# Patient Record
Sex: MALE | Race: WHITE | NOT HISPANIC OR LATINO | Employment: FULL TIME | URBAN - METROPOLITAN AREA
[De-identification: names, ages, dates, MRNs, and addresses within clinical notes are randomized per-mention and may not be internally consistent; named-entity substitution may affect disease eponyms.]

---

## 2023-03-07 PROBLEM — R21 RASH: Status: ACTIVE | Noted: 2023-03-07

## 2023-03-07 PROBLEM — S90.859A FOREIGN BODY IN FOOT: Status: ACTIVE | Noted: 2023-03-07

## 2023-03-07 PROBLEM — S49.91XA SHOULDER INJURY, RIGHT, INITIAL ENCOUNTER: Status: ACTIVE | Noted: 2023-03-07

## 2023-03-07 PROBLEM — E66.3 OVERWEIGHT WITH BODY MASS INDEX (BMI) OF 26 TO 26.9 IN ADULT: Status: ACTIVE | Noted: 2023-03-07

## 2023-03-07 PROBLEM — S09.90XA HEAD INJURY: Status: ACTIVE | Noted: 2023-03-07

## 2023-03-07 PROBLEM — H92.09 EAR PAIN: Status: ACTIVE | Noted: 2023-03-07

## 2023-03-07 PROBLEM — R10.9 PAIN IN THE ABDOMEN: Status: ACTIVE | Noted: 2023-03-07

## 2023-03-07 PROBLEM — R93.5 ABNORMAL CT OF THE ABDOMEN: Status: ACTIVE | Noted: 2023-03-07

## 2023-03-07 PROBLEM — K58.0 IRRITABLE BOWEL SYNDROME WITH DIARRHEA: Status: ACTIVE | Noted: 2023-03-07

## 2023-03-07 PROBLEM — R11.10 VOMITING: Status: ACTIVE | Noted: 2023-03-07

## 2023-03-07 PROBLEM — S99.929A FOOT INJURY: Status: ACTIVE | Noted: 2023-03-07

## 2023-03-07 PROBLEM — K52.9 COLITIS: Status: ACTIVE | Noted: 2023-03-07

## 2023-03-07 PROBLEM — Z86.69 HISTORY OF RECURRENT EAR INFECTION: Status: ACTIVE | Noted: 2023-03-07

## 2023-03-07 PROBLEM — R53.83 FATIGUE: Status: ACTIVE | Noted: 2023-03-07

## 2023-03-07 PROBLEM — R10.9 RIGHT SIDED ABDOMINAL PAIN: Status: ACTIVE | Noted: 2023-03-07

## 2023-03-07 PROBLEM — K52.9 CHRONIC DIARRHEA: Status: ACTIVE | Noted: 2023-03-07

## 2023-03-07 RX ORDER — DICYCLOMINE HYDROCHLORIDE 10 MG/1
10 CAPSULE ORAL 3 TIMES DAILY
COMMUNITY

## 2023-03-07 RX ORDER — OMEPRAZOLE 40 MG/1
CAPSULE, DELAYED RELEASE ORAL
COMMUNITY

## 2023-03-08 ENCOUNTER — OFFICE VISIT (OUTPATIENT)
Dept: PRIMARY CARE | Facility: CLINIC | Age: 33
End: 2023-03-08
Payer: COMMERCIAL

## 2023-03-08 VITALS
WEIGHT: 167 LBS | DIASTOLIC BLOOD PRESSURE: 82 MMHG | BODY MASS INDEX: 23.91 KG/M2 | SYSTOLIC BLOOD PRESSURE: 132 MMHG | HEIGHT: 70 IN

## 2023-03-08 DIAGNOSIS — Z00.00 ROUTINE CHECK-UP: Primary | ICD-10-CM

## 2023-03-08 PROCEDURE — 99213 OFFICE O/P EST LOW 20 MIN: CPT | Performed by: INTERNAL MEDICINE

## 2023-03-11 NOTE — PROGRESS NOTES
"OFFICE NOTE    NAME OF THE PATIENT: Prem Mirza     YOB: 1990    CHIEF COMPLAINT:  This gentleman today came here for follow-up on various conditions.  His Crohn's disease flareup is better.  For pain he is on dicyclomine.  He is going to see Dr. Huizar.  He had a biopsy taken.  He is a bit disappointed by that.  He loves his job, but pain becomes so intractable he has to take time off.  He is very frustrated with that.    PAST MEDICAL HISTORY:  Reviewed on EMR, unchanged.    CURRENT MEDICATIONS:  Reviewed on EMR, unchanged.  List reviewed.    ALLERGIES:  Reviewed on EMR, unchanged.    SOCIAL HISTORY:  Reviewed on EMR, unchanged.  He does not smoke, does not drink alcohol.    FAMILY HISTORY:  Reviewed on EMR, unchanged.    REVIEW OF SYSTEMS:  All 12 systems reviewed and pertaining covered in history and physical.    PHYSICAL EXAMINATION  VITAL SIGNS:  As recorded and reviewed from EMR.  RESPIRATORY:  The patient had normal inspirations and expirations.  The breath sounds were equal bilaterally and clear to auscultation.  CARDIOVASCULAR:  The patient had S1 normal, split S2 without obvious rubs, clicks, or murmurs.    GASTROINTESTINAL:  There was no hepatosplenomegaly.  There were no palpable masses and no inguinal nodes.  EXTREMITIES:  Legs had no edema.  NEUROLOGIC:  The patient had normal cranial nerves.  The reflexes, sensory, and motor examination were grossly within normal limits.    LAB WORK:  Laboratory testing discussed.    ASSESSMENT AND PLAN:  Crohn's disease, slow improvement.  Anemia.  Monitor.  Follow-up appointment with me in three to six months, but I will rely on GI to do workup.  I will take care of it.  [facility] paper filled out.    Kindly review this note in conjunction with EMR.   Subjective   Patient ID: Prem Mirza is a 32 y.o. male who presents for Follow-up.      HPI    Review of Systems    Objective   /82   Ht 1.765 m (5' 9.5\")   Wt 75.8 kg (167 lb)   BMI 24.31 " kg/m²       Physical Exam    Assessment/Plan   Problem List Items Addressed This Visit    None

## 2023-06-19 ENCOUNTER — OFFICE VISIT (OUTPATIENT)
Dept: PRIMARY CARE | Facility: CLINIC | Age: 33
End: 2023-06-19
Payer: COMMERCIAL

## 2023-06-19 VITALS
BODY MASS INDEX: 24.14 KG/M2 | HEIGHT: 70 IN | SYSTOLIC BLOOD PRESSURE: 124 MMHG | DIASTOLIC BLOOD PRESSURE: 72 MMHG | WEIGHT: 168.6 LBS

## 2023-06-19 DIAGNOSIS — T14.90XA TRAUMA: ICD-10-CM

## 2023-06-19 DIAGNOSIS — G89.29 CHRONIC LEFT SHOULDER PAIN: ICD-10-CM

## 2023-06-19 DIAGNOSIS — M25.512 CHRONIC LEFT SHOULDER PAIN: ICD-10-CM

## 2023-06-19 DIAGNOSIS — B99.9 INFECTION: ICD-10-CM

## 2023-06-19 PROCEDURE — 90471 IMMUNIZATION ADMIN: CPT | Performed by: INTERNAL MEDICINE

## 2023-06-19 PROCEDURE — 90715 TDAP VACCINE 7 YRS/> IM: CPT | Performed by: INTERNAL MEDICINE

## 2023-06-19 PROCEDURE — 99214 OFFICE O/P EST MOD 30 MIN: CPT | Performed by: INTERNAL MEDICINE

## 2023-06-19 RX ORDER — NABUMETONE 750 MG/1
750 TABLET, FILM COATED ORAL 2 TIMES DAILY
Qty: 60 TABLET | Refills: 11 | Status: SHIPPED | OUTPATIENT
Start: 2023-06-19 | End: 2023-12-17 | Stop reason: HOSPADM
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
## 2023-12-12 ENCOUNTER — HOSPITAL ENCOUNTER (EMERGENCY)
Facility: HOSPITAL | Age: 33
Discharge: HOME | End: 2023-12-12
Attending: EMERGENCY MEDICINE
Payer: COMMERCIAL

## 2023-12-12 ENCOUNTER — APPOINTMENT (OUTPATIENT)
Dept: RADIOLOGY | Facility: HOSPITAL | Age: 33
End: 2023-12-12
Payer: COMMERCIAL

## 2023-12-12 VITALS
OXYGEN SATURATION: 97 % | WEIGHT: 186.95 LBS | RESPIRATION RATE: 18 BRPM | HEART RATE: 80 BPM | HEIGHT: 69 IN | BODY MASS INDEX: 27.69 KG/M2 | DIASTOLIC BLOOD PRESSURE: 77 MMHG | SYSTOLIC BLOOD PRESSURE: 158 MMHG | TEMPERATURE: 97.9 F

## 2023-12-12 DIAGNOSIS — R10.84 ABDOMINAL PAIN, GENERALIZED: Primary | ICD-10-CM

## 2023-12-12 LAB
ALBUMIN SERPL-MCNC: 4.1 G/DL (ref 3.5–5)
ALP BLD-CCNC: 79 U/L (ref 35–125)
ALT SERPL-CCNC: 18 U/L (ref 5–40)
ANION GAP SERPL CALC-SCNC: 14 MMOL/L
APPEARANCE UR: CLEAR
AST SERPL-CCNC: 30 U/L (ref 5–40)
BASOPHILS # BLD AUTO: 0.05 X10*3/UL (ref 0–0.1)
BASOPHILS NFR BLD AUTO: 0.3 %
BILIRUB DIRECT SERPL-MCNC: 0.2 MG/DL (ref 0–0.2)
BILIRUB SERPL-MCNC: 1.1 MG/DL (ref 0.1–1.2)
BILIRUB UR STRIP.AUTO-MCNC: NEGATIVE MG/DL
BUN SERPL-MCNC: 8 MG/DL (ref 8–25)
CALCIUM SERPL-MCNC: 9.2 MG/DL (ref 8.5–10.4)
CHLORIDE SERPL-SCNC: 101 MMOL/L (ref 97–107)
CO2 SERPL-SCNC: 22 MMOL/L (ref 24–31)
COLOR UR: YELLOW
CREAT SERPL-MCNC: 0.9 MG/DL (ref 0.4–1.6)
EOSINOPHIL # BLD AUTO: 0.34 X10*3/UL (ref 0–0.7)
EOSINOPHIL NFR BLD AUTO: 2.4 %
ERYTHROCYTE [DISTWIDTH] IN BLOOD BY AUTOMATED COUNT: 13 % (ref 11.5–14.5)
GFR SERPL CREATININE-BSD FRML MDRD: >90 ML/MIN/1.73M*2
GLUCOSE SERPL-MCNC: 96 MG/DL (ref 65–99)
GLUCOSE UR STRIP.AUTO-MCNC: NORMAL MG/DL
HCT VFR BLD AUTO: 45.8 % (ref 41–52)
HGB BLD-MCNC: 15.9 G/DL (ref 13.5–17.5)
IMM GRANULOCYTES # BLD AUTO: 0.04 X10*3/UL (ref 0–0.7)
IMM GRANULOCYTES NFR BLD AUTO: 0.3 % (ref 0–0.9)
KETONES UR STRIP.AUTO-MCNC: ABNORMAL MG/DL
LEUKOCYTE ESTERASE UR QL STRIP.AUTO: NEGATIVE
LIPASE SERPL-CCNC: 26 U/L (ref 16–63)
LYMPHOCYTES # BLD AUTO: 1.35 X10*3/UL (ref 1.2–4.8)
LYMPHOCYTES NFR BLD AUTO: 9.4 %
MCH RBC QN AUTO: 29.9 PG (ref 26–34)
MCHC RBC AUTO-ENTMCNC: 34.7 G/DL (ref 32–36)
MCV RBC AUTO: 86 FL (ref 80–100)
MONOCYTES # BLD AUTO: 1 X10*3/UL (ref 0.1–1)
MONOCYTES NFR BLD AUTO: 7 %
MUCOUS THREADS #/AREA URNS AUTO: NORMAL /LPF
NEUTROPHILS # BLD AUTO: 11.54 X10*3/UL (ref 1.2–7.7)
NEUTROPHILS NFR BLD AUTO: 80.6 %
NITRITE UR QL STRIP.AUTO: NEGATIVE
NRBC BLD-RTO: 0 /100 WBCS (ref 0–0)
PH UR STRIP.AUTO: 6.5 [PH]
PLATELET # BLD AUTO: 195 X10*3/UL (ref 150–450)
POTASSIUM SERPL-SCNC: 3.7 MMOL/L (ref 3.4–5.1)
PROT SERPL-MCNC: 7.3 G/DL (ref 5.9–7.9)
PROT UR STRIP.AUTO-MCNC: ABNORMAL MG/DL
RBC # BLD AUTO: 5.31 X10*6/UL (ref 4.5–5.9)
RBC # UR STRIP.AUTO: NEGATIVE /UL
RBC #/AREA URNS AUTO: NORMAL /HPF
SODIUM SERPL-SCNC: 137 MMOL/L (ref 133–145)
SP GR UR STRIP.AUTO: 1.03
UROBILINOGEN UR STRIP.AUTO-MCNC: NORMAL MG/DL
WBC # BLD AUTO: 14.3 X10*3/UL (ref 4.4–11.3)
WBC #/AREA URNS AUTO: NORMAL /HPF

## 2023-12-12 PROCEDURE — 99284 EMERGENCY DEPT VISIT MOD MDM: CPT | Mod: 25 | Performed by: EMERGENCY MEDICINE

## 2023-12-12 PROCEDURE — 85025 COMPLETE CBC W/AUTO DIFF WBC: CPT | Performed by: EMERGENCY MEDICINE

## 2023-12-12 PROCEDURE — 82374 ASSAY BLOOD CARBON DIOXIDE: CPT | Performed by: EMERGENCY MEDICINE

## 2023-12-12 PROCEDURE — 96374 THER/PROPH/DIAG INJ IV PUSH: CPT | Mod: 59

## 2023-12-12 PROCEDURE — 2550000001 HC RX 255 CONTRASTS: Performed by: EMERGENCY MEDICINE

## 2023-12-12 PROCEDURE — 83690 ASSAY OF LIPASE: CPT | Performed by: EMERGENCY MEDICINE

## 2023-12-12 PROCEDURE — 36415 COLL VENOUS BLD VENIPUNCTURE: CPT | Performed by: EMERGENCY MEDICINE

## 2023-12-12 PROCEDURE — 74177 CT ABD & PELVIS W/CONTRAST: CPT

## 2023-12-12 PROCEDURE — 84075 ASSAY ALKALINE PHOSPHATASE: CPT | Performed by: EMERGENCY MEDICINE

## 2023-12-12 PROCEDURE — 81001 URINALYSIS AUTO W/SCOPE: CPT | Performed by: EMERGENCY MEDICINE

## 2023-12-12 PROCEDURE — 96375 TX/PRO/DX INJ NEW DRUG ADDON: CPT | Mod: 59

## 2023-12-12 PROCEDURE — 2500000004 HC RX 250 GENERAL PHARMACY W/ HCPCS (ALT 636 FOR OP/ED): Performed by: EMERGENCY MEDICINE

## 2023-12-12 PROCEDURE — 2500000004 HC RX 250 GENERAL PHARMACY W/ HCPCS (ALT 636 FOR OP/ED)

## 2023-12-12 PROCEDURE — 71046 X-RAY EXAM CHEST 2 VIEWS: CPT

## 2023-12-12 RX ORDER — ONDANSETRON HYDROCHLORIDE 2 MG/ML
4 INJECTION, SOLUTION INTRAVENOUS ONCE
Status: COMPLETED | OUTPATIENT
Start: 2023-12-12 | End: 2023-12-12

## 2023-12-12 RX ORDER — CIPROFLOXACIN 500 MG/1
500 TABLET ORAL 2 TIMES DAILY
Qty: 14 TABLET | Refills: 0 | Status: SHIPPED | OUTPATIENT
Start: 2023-12-12 | End: 2023-12-19

## 2023-12-12 RX ORDER — ONDANSETRON HYDROCHLORIDE 2 MG/ML
INJECTION, SOLUTION INTRAVENOUS
Status: COMPLETED
Start: 2023-12-12 | End: 2023-12-12

## 2023-12-12 RX ORDER — METRONIDAZOLE 500 MG/1
500 TABLET ORAL 3 TIMES DAILY
Qty: 21 TABLET | Refills: 0 | Status: SHIPPED | OUTPATIENT
Start: 2023-12-12 | End: 2023-12-19

## 2023-12-12 RX ORDER — MORPHINE SULFATE 4 MG/ML
4 INJECTION, SOLUTION INTRAMUSCULAR; INTRAVENOUS ONCE
Status: COMPLETED | OUTPATIENT
Start: 2023-12-12 | End: 2023-12-12

## 2023-12-12 RX ADMIN — ONDANSETRON HYDROCHLORIDE 4 MG: 2 INJECTION INTRAMUSCULAR; INTRAVENOUS at 13:35

## 2023-12-12 RX ADMIN — IOHEXOL 75 ML: 350 INJECTION, SOLUTION INTRAVENOUS at 13:58

## 2023-12-12 RX ADMIN — ONDANSETRON 4 MG: 2 INJECTION INTRAMUSCULAR; INTRAVENOUS at 13:35

## 2023-12-12 RX ADMIN — MORPHINE SULFATE 4 MG: 4 INJECTION, SOLUTION INTRAMUSCULAR; INTRAVENOUS at 13:23

## 2023-12-12 RX ADMIN — ONDANSETRON 4 MG: 2 INJECTION INTRAMUSCULAR; INTRAVENOUS at 13:23

## 2023-12-12 ASSESSMENT — COLUMBIA-SUICIDE SEVERITY RATING SCALE - C-SSRS
1. IN THE PAST MONTH, HAVE YOU WISHED YOU WERE DEAD OR WISHED YOU COULD GO TO SLEEP AND NOT WAKE UP?: NO
6. HAVE YOU EVER DONE ANYTHING, STARTED TO DO ANYTHING, OR PREPARED TO DO ANYTHING TO END YOUR LIFE?: NO
2. HAVE YOU ACTUALLY HAD ANY THOUGHTS OF KILLING YOURSELF?: NO

## 2023-12-12 ASSESSMENT — PAIN DESCRIPTION - ORIENTATION
ORIENTATION: LEFT
ORIENTATION: LEFT;UPPER

## 2023-12-12 ASSESSMENT — PAIN DESCRIPTION - FREQUENCY: FREQUENCY: CONSTANT/CONTINUOUS

## 2023-12-12 ASSESSMENT — PAIN - FUNCTIONAL ASSESSMENT
PAIN_FUNCTIONAL_ASSESSMENT: 0-10
PAIN_FUNCTIONAL_ASSESSMENT: 0-10

## 2023-12-12 ASSESSMENT — PAIN DESCRIPTION - LOCATION
LOCATION: ABDOMEN
LOCATION: ABDOMEN

## 2023-12-12 ASSESSMENT — PAIN SCALES - GENERAL
PAINLEVEL_OUTOF10: 10 - WORST POSSIBLE PAIN
PAINLEVEL_OUTOF10: 7

## 2023-12-12 ASSESSMENT — PAIN DESCRIPTION - DESCRIPTORS: DESCRIPTORS: SHARP

## 2023-12-12 ASSESSMENT — PAIN DESCRIPTION - ONSET: ONSET: GRADUAL

## 2023-12-12 ASSESSMENT — PAIN DESCRIPTION - PAIN TYPE: TYPE: ACUTE PAIN

## 2023-12-12 ASSESSMENT — PAIN DESCRIPTION - PROGRESSION: CLINICAL_PROGRESSION: GRADUALLY WORSENING

## 2023-12-12 NOTE — Clinical Note
Prem Mirza was seen and treated in our emergency department on 12/12/2023.  He may return to work on 12/13/2023.       If you have any questions or concerns, please don't hesitate to call.      Onur Mitchell MD

## 2023-12-12 NOTE — ED TRIAGE NOTES
TRIAGE NOTE   I saw the patient as the Clinician in Triage and performed a brief history and physical exam, established acuity, and ordered appropriate tests to develop basic plan of care. Patient will be seen by an [facility], resident and/or physician who will independently evaluate the patient. Please see subsequent provider notes for further details and disposition.     Brief HPI: In brief, Prem Mirza is a 33 y.o. male that presents for   Left-sided rib, abdominal pain.  He has a history of includes Crohn's disease.  He has had left upper quadrant and left lower rib pain for the last 1 day or so.  Seems to have been aggravated by eating although uncertain if this is related to his Crohn's.  No fevers.     Focused Physical exam:   General: Awake, alert, oriented and appears  uncomfortable, not overtly ill sitting upright  HEENT: NCAT, EOMI, sclerae anicteric, oropharynx clear, and neck is supple  CV: RRR, no extremity pulse deficits  Resp: Lungs CTA b/l, no respiratory distress or accessory muscle use  Abd: Soft,  left upper quadrant tenderness to deep palpation, no rebound, or guarding  Ext: WWP, cap refill < 2 sec  Skin: Dry without rash  Neuro: No focal deficits, gait steady     Plan/MDM:   33-year-old male with history that includes Crohn's disease presents reporting left-sided pain that appears to be low rib and  upper abdominal pain.  He clearly has abdominal tenderness on exam.  Colitis, diverticulitis, Crohn's disease otherwise all considered.   No armando respiratory symptoms and I doubt this represents pneumonia or pneumothorax.  Plan for lab work, imaging, symptom control, reassessment.    Please see subsequent provider note for further details and disposition

## 2023-12-12 NOTE — ED PROVIDER NOTES
HPI   Chief Complaint   Patient presents with    Abdominal Pain     Since 1900 last night, LUQ abd pain- no trauma, nausea and vomit last night, hx of crohns,        HPI  Patient 33-year-old male here for evaluation of abdominal pain, generally left-sided, history of Crohn's, says that he does not take any medications for this as he chooses not to, he also does not follow with a gastroenterologist as he chooses not to.  He indicated that he believes he might be having a flareup as he had on before, and past she has been treated with Cipro says that he has trouble taking nearly all other antibiotics due to hives.  He indicated no injury or trauma.                  No data recorded                Patient History   No past medical history on file.  No past surgical history on file.  No family history on file.  Social History     Tobacco Use    Smoking status: Never    Smokeless tobacco: Never   Substance Use Topics    Alcohol use: Not on file    Drug use: Never       Physical Exam   ED Triage Vitals [12/12/23 1300]   Temp Heart Rate Resp BP   36.6 °C (97.9 °F) 80 20 153/84      SpO2 Temp Source Heart Rate Source Patient Position   95 % Oral Monitor Sitting      BP Location FiO2 (%)     Right arm --       Physical Exam  PHYSICAL EXAMINATION    GENERAL APPEARANCE: Awake and alert.     VITAL SIGNS: As per the nurses' triage record.     HEENT: Normocephalic, atraumatic. Extraocular muscles are intact. Pupils equal round and reactive to light. Conjunctiva are pink. Negative scleral icterus. Mucous membranes are moist. Tongue in the midline. Pharynx was without erythema or exudates, uvula midline    NECK: Soft Nontender and supple, full gross ROM, no meningeal signs.    CHEST: Nontender to palpation. Clear to auscultation bilaterally. No rales, rhonchi, or wheezing.     HEART: S1, S2. Regular rate and rhythm. No murmurs, gallops or rubs.  Strong and equal pulses in the extremities.     ABDOMEN: Soft, subjective generalized  abdominal discomfort no guarding rebound or rigidity, nondistended, positive bowel sounds, no palpable masses.    MUSCULOSKELETAL: The calves are nontender to palpation. Full gross active range of motion. Ambulating on own with no acute difficulties     NEUROLOGICAL: Awake, alert and oriented x 3. Power intact in the upper and lower extremities. Sensation is intact to light touch in the upper and lower extremities.     IMMUNOLOGICAL: No lymphatic streaking noted     DERM: No petechiae, rashes, or ecchymoses.  ED Course & MDM   ED Course as of 12/12/23 1556 Tue Dec 12, 2023   1555 I personally saw and evaluated the patient and performed a substantial portion of the visit including all aspects of the history, physical exam, medical decision making.  All diagnostic, treatment, and disposition decisions were made by me in conjunction with the [facility] as noted in the medical record.  Initial H&P was obtained by the [facility], who also documented a record of this visit.        Onur Mitchell MD     [HH]      ED Course User Index  [HH] Onur Mitchell MD         Diagnoses as of 12/12/23 1556   Abdominal pain, generalized       Medical Decision Making   Patient has been seen in conjunction with attending physician Dr. Mitchell  Parts of this chart have been completed using voice recognition software. Please excuse any errors of transcription.  My thought process and reason for plan has been formulated from the time that I saw the patient until the time of disposition and is not specific to one specific moment during their visit and furthermore my MDM encompasses this entire chart and not only this text box.      HPI: Detailed above.    Exam: A medically appropriate exam performed, outlined above, given the known history and presentation.    History obtained from: Patient    Social Determinants of Health considered during this visit: Lives at home    Medications given during visit:  Medications   morphine injection 4 mg  (4 mg intravenous Given 12/12/23 1323)   ondansetron (Zofran) injection 4 mg (4 mg intravenous Given 12/12/23 1335)   iohexol (OMNIPaque) 350 mg iodine/mL solution 75 mL (75 mL intravenous Given 12/12/23 0818)        Diagnostic/tests  Labs Reviewed   CBC WITH AUTO DIFFERENTIAL - Abnormal       Result Value    WBC 14.3 (*)     nRBC 0.0      RBC 5.31      Hemoglobin 15.9      Hematocrit 45.8      MCV 86      MCH 29.9      MCHC 34.7      RDW 13.0      Platelets 195      Neutrophils % 80.6      Immature Granulocytes %, Automated 0.3      Lymphocytes % 9.4      Monocytes % 7.0      Eosinophils % 2.4      Basophils % 0.3      Neutrophils Absolute 11.54 (*)     Immature Granulocytes Absolute, Automated 0.04      Lymphocytes Absolute 1.35      Monocytes Absolute 1.00      Eosinophils Absolute 0.34      Basophils Absolute 0.05     BASIC METABOLIC PANEL - Abnormal    Glucose 96      Sodium 137      Potassium 3.7      Chloride 101      Bicarbonate 22 (*)     Urea Nitrogen 8      Creatinine 0.90      eGFR >90      Calcium 9.2      Anion Gap 14     URINALYSIS WITH REFLEX MICROSCOPIC - Abnormal    Color, Urine Yellow      Appearance, Urine Clear      Specific Gravity, Urine 1.026      pH, Urine 6.5      Protein, Urine 10 (TRACE)      Glucose, Urine Normal      Blood, Urine NEGATIVE      Ketones, Urine 20 (1+) (*)     Bilirubin, Urine NEGATIVE      Urobilinogen, Urine Normal      Nitrite, Urine NEGATIVE      Leukocyte Esterase, Urine NEGATIVE     HEPATIC FUNCTION PANEL - Normal    AST 30      ALT 18      Alkaline Phosphatase 79      Bilirubin, Total 1.1      Bilirubin, Direct 0.2      Total Protein 7.3      Albumin 4.1     LIPASE - Normal    Lipase 26     MICROSCOPIC ONLY, URINE    WBC, Urine 1-5      RBC, Urine 3-5      Mucus, Urine 1+        XR chest 2 views   Final Result   No evidence of acute cardiopulmonary process.             MACRO:   None        Signed by: Samir Lyons 12/12/2023 2:59 PM   Dictation workstation:    [id number]      CT abdomen pelvis w IV contrast   Final Result   There are multiple prominent, subcentimeter lymph nodes involving the   right hemiabdomen. This is nonspecific, and may represent an   infectious or inflammatory etiology.        MACRO:   None        Signed by: Samir Eloy 12/12/2023 3:03 PM   Dictation workstation:   [id number]           Considerations/further MDM:  I estimate there is low risk for acute abdomen.  I have considered the following: acute appendicitis, bowel obstruction, cholecystitis, diverticulitis, incarcerated hernia, mesenteric ischemia, pancreatitis, acute liver failure, renal failure, UTI/Pyelonephritis to an extent that requires admission and IV abx, perforated bowel, or ulcers.    Thus I consider the discharge disposition reasonable. Also, there is no evidence or peritonitis, sepsis, or toxicity. We have discussed the diagnosis and risks, and we agree with discharging home to follow-up with appropriate physician as directed. We also discussed returning to the Emergency Department immediately if new or worsening symptoms occur. We have discussed the symptoms which are most concerning (e.g., bloody stool, fever, changing or worsening pain, nausea, vomiting) that necessitate immediate return. Pt symptoms have been well controlled here with medications and the patient is lower risk for acute/surgical abdomen and is safe for discharge with appropriate outpatient follow up.  The patient has verbalized understanding to return to ER without delay for new or worsening pains or for any other symptoms or concerns.    Patient has some enlarged lymph nodes identified, patient in no acute distress, resting comfortably.  Patient will be treated with Cipro, I discussed other antibiotics to avoid fluoroquinolones however he says that he cannot take any other antibiotics and says that Cipro is usually what helps him.      Procedure  Procedures     Parth Paz PA-C  12/12/23 6988

## 2023-12-15 ENCOUNTER — APPOINTMENT (OUTPATIENT)
Dept: RADIOLOGY | Facility: HOSPITAL | Age: 33
DRG: 392 | End: 2023-12-15
Payer: COMMERCIAL

## 2023-12-15 ENCOUNTER — HOSPITAL ENCOUNTER (INPATIENT)
Facility: HOSPITAL | Age: 33
LOS: 2 days | Discharge: HOME | DRG: 392 | End: 2023-12-17
Attending: EMERGENCY MEDICINE | Admitting: INTERNAL MEDICINE
Payer: COMMERCIAL

## 2023-12-15 DIAGNOSIS — K50.90 EXACERBATION OF CROHN'S DISEASE WITHOUT COMPLICATION (MULTI): Primary | ICD-10-CM

## 2023-12-15 DIAGNOSIS — Z87.19 HISTORY OF CROHN'S DISEASE: ICD-10-CM

## 2023-12-15 DIAGNOSIS — R10.9 RIGHT SIDED ABDOMINAL PAIN: ICD-10-CM

## 2023-12-15 DIAGNOSIS — R10.84 GENERALIZED ABDOMINAL PAIN: ICD-10-CM

## 2023-12-15 LAB
ABO GROUP (TYPE) IN BLOOD: NORMAL
ALBUMIN SERPL-MCNC: 4 G/DL (ref 3.5–5)
ALP BLD-CCNC: 75 U/L (ref 35–125)
ALT SERPL-CCNC: 15 U/L (ref 5–40)
ANION GAP SERPL CALC-SCNC: 12 MMOL/L
ANTIBODY SCREEN: NORMAL
APPEARANCE UR: CLEAR
AST SERPL-CCNC: 25 U/L (ref 5–40)
BILIRUB DIRECT SERPL-MCNC: <0.2 MG/DL (ref 0–0.2)
BILIRUB SERPL-MCNC: 0.6 MG/DL (ref 0.1–1.2)
BILIRUB UR STRIP.AUTO-MCNC: NEGATIVE MG/DL
BUN SERPL-MCNC: 8 MG/DL (ref 8–25)
CALCIUM SERPL-MCNC: 8.8 MG/DL (ref 8.5–10.4)
CHLORIDE SERPL-SCNC: 102 MMOL/L (ref 97–107)
CO2 SERPL-SCNC: 23 MMOL/L (ref 24–31)
COLOR UR: NORMAL
CREAT SERPL-MCNC: 0.8 MG/DL (ref 0.4–1.6)
ERYTHROCYTE [DISTWIDTH] IN BLOOD BY AUTOMATED COUNT: 12.7 % (ref 11.5–14.5)
GFR SERPL CREATININE-BSD FRML MDRD: >90 ML/MIN/1.73M*2
GLUCOSE SERPL-MCNC: 147 MG/DL (ref 65–99)
GLUCOSE UR STRIP.AUTO-MCNC: NORMAL MG/DL
HCT VFR BLD AUTO: 45.9 % (ref 41–52)
HGB BLD-MCNC: 15.5 G/DL (ref 13.5–17.5)
KETONES UR STRIP.AUTO-MCNC: NEGATIVE MG/DL
LACTATE BLDV-SCNC: 1.2 MMOL/L (ref 0.4–2)
LEUKOCYTE ESTERASE UR QL STRIP.AUTO: NEGATIVE
LIPASE SERPL-CCNC: 24 U/L (ref 16–63)
MCH RBC QN AUTO: 29.6 PG (ref 26–34)
MCHC RBC AUTO-ENTMCNC: 33.8 G/DL (ref 32–36)
MCV RBC AUTO: 88 FL (ref 80–100)
NITRITE UR QL STRIP.AUTO: NEGATIVE
NRBC BLD-RTO: 0 /100 WBCS (ref 0–0)
PH UR STRIP.AUTO: 7 [PH]
PLATELET # BLD AUTO: 196 X10*3/UL (ref 150–450)
POTASSIUM SERPL-SCNC: 3.6 MMOL/L (ref 3.4–5.1)
PROT SERPL-MCNC: 7.2 G/DL (ref 5.9–7.9)
PROT UR STRIP.AUTO-MCNC: NEGATIVE MG/DL
RBC # BLD AUTO: 5.24 X10*6/UL (ref 4.5–5.9)
RBC # UR STRIP.AUTO: NEGATIVE /UL
RH FACTOR (ANTIGEN D): NORMAL
SODIUM SERPL-SCNC: 137 MMOL/L (ref 133–145)
SP GR UR STRIP.AUTO: 1.02
UROBILINOGEN UR STRIP.AUTO-MCNC: NORMAL MG/DL
WBC # BLD AUTO: 13.7 X10*3/UL (ref 4.4–11.3)

## 2023-12-15 PROCEDURE — C9113 INJ PANTOPRAZOLE SODIUM, VIA: HCPCS | Performed by: EMERGENCY MEDICINE

## 2023-12-15 PROCEDURE — 2500000002 HC RX 250 W HCPCS SELF ADMINISTERED DRUGS (ALT 637 FOR MEDICARE OP, ALT 636 FOR OP/ED): Performed by: EMERGENCY MEDICINE

## 2023-12-15 PROCEDURE — 99285 EMERGENCY DEPT VISIT HI MDM: CPT | Performed by: EMERGENCY MEDICINE

## 2023-12-15 PROCEDURE — 36415 COLL VENOUS BLD VENIPUNCTURE: CPT | Performed by: EMERGENCY MEDICINE

## 2023-12-15 PROCEDURE — 2550000001 HC RX 255 CONTRASTS: Performed by: EMERGENCY MEDICINE

## 2023-12-15 PROCEDURE — 96375 TX/PRO/DX INJ NEW DRUG ADDON: CPT

## 2023-12-15 PROCEDURE — 86901 BLOOD TYPING SEROLOGIC RH(D): CPT | Performed by: EMERGENCY MEDICINE

## 2023-12-15 PROCEDURE — 96372 THER/PROPH/DIAG INJ SC/IM: CPT | Mod: 59

## 2023-12-15 PROCEDURE — S4991 NICOTINE PATCH NONLEGEND: HCPCS | Performed by: EMERGENCY MEDICINE

## 2023-12-15 PROCEDURE — 74177 CT ABD & PELVIS W/CONTRAST: CPT

## 2023-12-15 PROCEDURE — 2500000001 HC RX 250 WO HCPCS SELF ADMINISTERED DRUGS (ALT 637 FOR MEDICARE OP): Performed by: INTERNAL MEDICINE

## 2023-12-15 PROCEDURE — 2500000004 HC RX 250 GENERAL PHARMACY W/ HCPCS (ALT 636 FOR OP/ED): Performed by: INTERNAL MEDICINE

## 2023-12-15 PROCEDURE — 1200000002 HC GENERAL ROOM WITH TELEMETRY DAILY

## 2023-12-15 PROCEDURE — 83605 ASSAY OF LACTIC ACID: CPT | Performed by: EMERGENCY MEDICINE

## 2023-12-15 PROCEDURE — 82248 BILIRUBIN DIRECT: CPT | Performed by: EMERGENCY MEDICINE

## 2023-12-15 PROCEDURE — 2500000004 HC RX 250 GENERAL PHARMACY W/ HCPCS (ALT 636 FOR OP/ED): Performed by: EMERGENCY MEDICINE

## 2023-12-15 PROCEDURE — 99222 1ST HOSP IP/OBS MODERATE 55: CPT | Performed by: INTERNAL MEDICINE

## 2023-12-15 PROCEDURE — 83690 ASSAY OF LIPASE: CPT | Performed by: EMERGENCY MEDICINE

## 2023-12-15 PROCEDURE — 85027 COMPLETE CBC AUTOMATED: CPT | Performed by: EMERGENCY MEDICINE

## 2023-12-15 PROCEDURE — 81003 URINALYSIS AUTO W/O SCOPE: CPT | Performed by: EMERGENCY MEDICINE

## 2023-12-15 PROCEDURE — 80053 COMPREHEN METABOLIC PANEL: CPT | Performed by: EMERGENCY MEDICINE

## 2023-12-15 PROCEDURE — 96374 THER/PROPH/DIAG INJ IV PUSH: CPT

## 2023-12-15 RX ORDER — TALC
3 POWDER (GRAM) TOPICAL DAILY
Status: DISCONTINUED | OUTPATIENT
Start: 2023-12-15 | End: 2023-12-17 | Stop reason: HOSPADM

## 2023-12-15 RX ORDER — ONDANSETRON HYDROCHLORIDE 2 MG/ML
4 INJECTION, SOLUTION INTRAVENOUS EVERY 6 HOURS PRN
Status: DISCONTINUED | OUTPATIENT
Start: 2023-12-15 | End: 2023-12-17 | Stop reason: HOSPADM

## 2023-12-15 RX ORDER — MORPHINE SULFATE 4 MG/ML
4 INJECTION, SOLUTION INTRAMUSCULAR; INTRAVENOUS
Status: DISCONTINUED | OUTPATIENT
Start: 2023-12-15 | End: 2023-12-17 | Stop reason: HOSPADM

## 2023-12-15 RX ORDER — FAMOTIDINE 10 MG/ML
40 INJECTION INTRAVENOUS ONCE
Status: COMPLETED | OUTPATIENT
Start: 2023-12-15 | End: 2023-12-15

## 2023-12-15 RX ORDER — DOCUSATE SODIUM 100 MG/1
100 CAPSULE, LIQUID FILLED ORAL 2 TIMES DAILY
Status: DISCONTINUED | OUTPATIENT
Start: 2023-12-15 | End: 2023-12-17 | Stop reason: HOSPADM

## 2023-12-15 RX ORDER — IBUPROFEN 200 MG
1 TABLET ORAL DAILY
Status: DISCONTINUED | OUTPATIENT
Start: 2023-12-15 | End: 2023-12-17 | Stop reason: HOSPADM

## 2023-12-15 RX ORDER — ACETAMINOPHEN 650 MG/1
650 SUPPOSITORY RECTAL EVERY 4 HOURS PRN
Status: DISCONTINUED | OUTPATIENT
Start: 2023-12-15 | End: 2023-12-17 | Stop reason: HOSPADM

## 2023-12-15 RX ORDER — POLYETHYLENE GLYCOL 3350 17 G/17G
17 POWDER, FOR SOLUTION ORAL DAILY
Status: DISCONTINUED | OUTPATIENT
Start: 2023-12-15 | End: 2023-12-17 | Stop reason: HOSPADM

## 2023-12-15 RX ORDER — KETOROLAC TROMETHAMINE 30 MG/ML
15 INJECTION, SOLUTION INTRAMUSCULAR; INTRAVENOUS ONCE
Status: COMPLETED | OUTPATIENT
Start: 2023-12-15 | End: 2023-12-15

## 2023-12-15 RX ORDER — ACETAMINOPHEN 160 MG/5ML
650 SOLUTION ORAL EVERY 4 HOURS PRN
Status: DISCONTINUED | OUTPATIENT
Start: 2023-12-15 | End: 2023-12-17 | Stop reason: HOSPADM

## 2023-12-15 RX ORDER — DICYCLOMINE HYDROCHLORIDE 10 MG/ML
20 INJECTION INTRAMUSCULAR ONCE
Status: COMPLETED | OUTPATIENT
Start: 2023-12-15 | End: 2023-12-15

## 2023-12-15 RX ORDER — GUAIFENESIN 600 MG/1
600 TABLET, EXTENDED RELEASE ORAL EVERY 12 HOURS PRN
Status: DISCONTINUED | OUTPATIENT
Start: 2023-12-15 | End: 2023-12-17 | Stop reason: HOSPADM

## 2023-12-15 RX ORDER — ACETAMINOPHEN 325 MG/1
650 TABLET ORAL EVERY 4 HOURS PRN
Status: DISCONTINUED | OUTPATIENT
Start: 2023-12-15 | End: 2023-12-17 | Stop reason: HOSPADM

## 2023-12-15 RX ORDER — DEXTROSE MONOHYDRATE, SODIUM CHLORIDE, AND POTASSIUM CHLORIDE 50; 1.49; 4.5 G/1000ML; G/1000ML; G/1000ML
100 INJECTION, SOLUTION INTRAVENOUS CONTINUOUS
Status: DISCONTINUED | OUTPATIENT
Start: 2023-12-15 | End: 2023-12-17 | Stop reason: HOSPADM

## 2023-12-15 RX ORDER — ONDANSETRON HYDROCHLORIDE 2 MG/ML
4 INJECTION, SOLUTION INTRAVENOUS EVERY 6 HOURS PRN
Status: DISCONTINUED | OUTPATIENT
Start: 2023-12-15 | End: 2023-12-15

## 2023-12-15 RX ORDER — GUAIFENESIN/DEXTROMETHORPHAN 100-10MG/5
5 SYRUP ORAL EVERY 4 HOURS PRN
Status: DISCONTINUED | OUTPATIENT
Start: 2023-12-15 | End: 2023-12-17 | Stop reason: HOSPADM

## 2023-12-15 RX ORDER — PANTOPRAZOLE SODIUM 40 MG/10ML
40 INJECTION, POWDER, LYOPHILIZED, FOR SOLUTION INTRAVENOUS ONCE
Status: COMPLETED | OUTPATIENT
Start: 2023-12-15 | End: 2023-12-15

## 2023-12-15 RX ADMIN — KETOROLAC TROMETHAMINE 15 MG: 30 INJECTION, SOLUTION INTRAMUSCULAR at 13:00

## 2023-12-15 RX ADMIN — ONDANSETRON 4 MG: 2 INJECTION INTRAMUSCULAR; INTRAVENOUS at 13:00

## 2023-12-15 RX ADMIN — Medication 3 MG: at 21:59

## 2023-12-15 RX ADMIN — IOHEXOL 75 ML: 350 INJECTION, SOLUTION INTRAVENOUS at 14:08

## 2023-12-15 RX ADMIN — MORPHINE SULFATE 4 MG: 4 INJECTION, SOLUTION INTRAMUSCULAR; INTRAVENOUS at 23:04

## 2023-12-15 RX ADMIN — PANTOPRAZOLE SODIUM 40 MG: 40 INJECTION, POWDER, FOR SOLUTION INTRAVENOUS at 12:58

## 2023-12-15 RX ADMIN — DICYCLOMINE HYDROCHLORIDE 20 MG: 20 INJECTION, SOLUTION INTRAMUSCULAR at 17:01

## 2023-12-15 RX ADMIN — FAMOTIDINE 40 MG: 10 INJECTION, SOLUTION INTRAVENOUS at 13:00

## 2023-12-15 RX ADMIN — MORPHINE SULFATE 4 MG: 4 INJECTION, SOLUTION INTRAMUSCULAR; INTRAVENOUS at 13:03

## 2023-12-15 RX ADMIN — DEXTROSE MONOHYDRATE, SODIUM CHLORIDE, AND POTASSIUM CHLORIDE 100 ML/HR: 50; 4.5; 1.49 INJECTION, SOLUTION INTRAVENOUS at 21:59

## 2023-12-15 RX ADMIN — MORPHINE SULFATE 4 MG: 4 INJECTION, SOLUTION INTRAMUSCULAR; INTRAVENOUS at 19:42

## 2023-12-15 RX ADMIN — SODIUM CHLORIDE 1000 ML: 900 INJECTION, SOLUTION INTRAVENOUS at 13:01

## 2023-12-15 RX ADMIN — Medication 1 PATCH: at 20:09

## 2023-12-15 ASSESSMENT — ENCOUNTER SYMPTOMS
UNEXPECTED WEIGHT CHANGE: 0
AGITATION: 0
EYE ITCHING: 0
CONSTIPATION: 0
CONFUSION: 0
COUGH: 0
SHORTNESS OF BREATH: 0
ABDOMINAL DISTENTION: 0
HEMATURIA: 0
COLOR CHANGE: 0
TROUBLE SWALLOWING: 0
ADENOPATHY: 0
MYALGIAS: 0
EYE REDNESS: 0
FEVER: 0
VOICE CHANGE: 0
TREMORS: 0
NAUSEA: 0
BRUISES/BLEEDS EASILY: 0
ANAL BLEEDING: 0
BLOOD IN STOOL: 0
PHOTOPHOBIA: 0
NUMBNESS: 0
HALLUCINATIONS: 0
LIGHT-HEADEDNESS: 0
ACTIVITY CHANGE: 0
WOUND: 0
EYE DISCHARGE: 0
POLYDIPSIA: 0
DIFFICULTY URINATING: 0
RHINORRHEA: 0
DYSPHORIC MOOD: 0
DIARRHEA: 1
SEIZURES: 0
DIAPHORESIS: 0
POLYPHAGIA: 0
WEAKNESS: 0
FLANK PAIN: 0
SPEECH DIFFICULTY: 0
CHOKING: 0
VOMITING: 0
SINUS PAIN: 0
WHEEZING: 0
FATIGUE: 0
HYPERACTIVE: 0
EYE PAIN: 0
ARTHRALGIAS: 0
PALPITATIONS: 0
DYSURIA: 0
HEADACHES: 0
CHEST TIGHTNESS: 0
APNEA: 0
BACK PAIN: 0
DIZZINESS: 0
NECK STIFFNESS: 0
DECREASED CONCENTRATION: 0
SLEEP DISTURBANCE: 0
SINUS PRESSURE: 0
FACIAL ASYMMETRY: 0
FACIAL SWELLING: 0
ABDOMINAL PAIN: 1
SORE THROAT: 0
APPETITE CHANGE: 0
CHILLS: 0
FREQUENCY: 0
JOINT SWELLING: 0
RECTAL PAIN: 0
NECK PAIN: 0
STRIDOR: 0
NERVOUS/ANXIOUS: 0

## 2023-12-15 ASSESSMENT — PAIN DESCRIPTION - PROGRESSION: CLINICAL_PROGRESSION: NOT CHANGED

## 2023-12-15 ASSESSMENT — PAIN DESCRIPTION - DESCRIPTORS
DESCRIPTORS: DULL;SHARP
DESCRIPTORS: SHARP;DULL
DESCRIPTORS: CRAMPING;SHARP

## 2023-12-15 ASSESSMENT — PAIN DESCRIPTION - LOCATION
LOCATION: ABDOMEN
LOCATION: ABDOMEN

## 2023-12-15 ASSESSMENT — PAIN SCALES - GENERAL
PAINLEVEL_OUTOF10: 0 - NO PAIN
PAINLEVEL_OUTOF10: 7
PAINLEVEL_OUTOF10: 10 - WORST POSSIBLE PAIN

## 2023-12-15 ASSESSMENT — PAIN - FUNCTIONAL ASSESSMENT: PAIN_FUNCTIONAL_ASSESSMENT: 0-10

## 2023-12-15 ASSESSMENT — COLUMBIA-SUICIDE SEVERITY RATING SCALE - C-SSRS
2. HAVE YOU ACTUALLY HAD ANY THOUGHTS OF KILLING YOURSELF?: NO
1. IN THE PAST MONTH, HAVE YOU WISHED YOU WERE DEAD OR WISHED YOU COULD GO TO SLEEP AND NOT WAKE UP?: NO
6. HAVE YOU EVER DONE ANYTHING, STARTED TO DO ANYTHING, OR PREPARED TO DO ANYTHING TO END YOUR LIFE?: NO

## 2023-12-15 ASSESSMENT — PAIN DESCRIPTION - FREQUENCY: FREQUENCY: CONSTANT/CONTINUOUS

## 2023-12-15 ASSESSMENT — PAIN DESCRIPTION - PAIN TYPE: TYPE: ACUTE PAIN

## 2023-12-15 ASSESSMENT — PAIN DESCRIPTION - ONSET: ONSET: PROGRESSIVE

## 2023-12-15 ASSESSMENT — PAIN DESCRIPTION - ORIENTATION: ORIENTATION: LEFT;OTHER (COMMENT)

## 2023-12-15 NOTE — ED PROVIDER NOTES
Care signed over to me at shift change pending CT results and case discussion with .  Discussed case Dr. Kay.  Admit for Crohn's exacerbation.  CT results reviewed     Opal CRESPO MD  12/15/23 6945

## 2023-12-15 NOTE — ED PROVIDER NOTES
EMERGENCY DEPARTMENT ENCOUNTER      [ ] CODE STEMI [ ] CODE Neuro [ ] CODE Yellow [ ] Modified Trauma [ ] CODE Blue      CHIEF COMPLAINT      Chief Complaint   Patient presents with    Abdominal Pain     Since Monday I started having abd pain the pain is sharp and dull it ebs and flows I have had vomiting and I have had black stools       Mode of Arrival:  Primary Care Provider: Wilver Kapoor MD  Medical Record Number: [id number]      History obtained by: Patient  Limited by nothing  Time seen: 1:50 PM    QUALITY MEASURES   PPE Utilized: N95 with goggles and gloves      HPI      Prem Mirza is a 33 y.o. male with a history of Crohn's disease diagnosed several months ago, not currently on any medications seen 4 days ago for apparent flare with CT scan only showing prominent lymph nodes in the right hemiabdomen, comes back to the emergency room stating that after his visit when he had vomiting at a time although he is not vomiting and has had no appetite and worsening abdominal pain initially stating that it appeared to be in the epigastric region but now states that it is across the abdomen.  Told triage about black stools but then patient clarified he only had 1 bloody bowel movement a few days ago which has happened with his Crohn's in the past and has not had it since and only having clear diarrhea.  Has not had any fevers but does endorse chills.  Denies take any pain medication for relief.  Did see  his PCP who recommended he come to the emergency room for further evaluation.  Did have a colonoscopy back in February when he had been diagnosed.  Otherwise denies any sick contacts or any foul food intake.  No other complaints.      Patient otherwise denies fever, vomiting, chest pain, shortness of breath, sore throat, cough, rhinorrhea,  dysuria, hematuria, swollen extremities or skin changes, hematemesis, melena or any other accompanying symptoms of late.      The patient has no other complaints at  this time.               PAST MEDICAL HISTORY    History reviewed. No pertinent past medical history.      I have personally reviewed the patient's past medical history in the records.  Rajesh Estrada MD    SURGICAL HISTORY    History reviewed. No pertinent surgical history.    I have personally reviewed the patient's past surgical history in the records.  Rajesh Estrada MD    CURRENT MEDICATIONS    I have reviewed the patient’s medications.   Please see nursing and pharmacy records for the most up to date list.     [unfilled]    ALLERGIES    Allergies   Allergen Reactions    Amoxicillin-Pot Clavulanate Unknown    Cephalexin Unknown    Azithromycin Other       I have personally reviewed the patient's past history of allergies in the records.  Rajesh Estrada MD    FAMILY HISTORY    No family history on file.    I have personally reviewed the patient's family history in the records.  Rajesh Estrada MD    SOCIAL HISTORY    Social History     Socioeconomic History    Marital status:      Spouse name: None    Number of children: None    Years of education: None    Highest education level: None   Occupational History    None   Tobacco Use    Smoking status: Every Day     Packs/day: 1.00     Years: 18.00     Additional pack years: 0.00     Total pack years: 18.00     Types: Cigarettes    Smokeless tobacco: Former     Types: Chew   Vaping Use    Vaping Use: Never used   Substance and Sexual Activity    Alcohol use: Yes     Alcohol/week: 4.0 standard drinks of alcohol     Types: 4 Shots of liquor per week    Drug use: Yes     Frequency: 7.0 times per week     Types: Marijuana    Sexual activity: None   Other Topics Concern    None   Social History Narrative    None     Social Determinants of Health     Financial Resource Strain: Low Risk  (12/17/2023)    Overall Financial Resource Strain (CARDIA)     Difficulty of Paying Living Expenses: Not very hard   Food Insecurity: Not on file   Transportation Needs: No Transportation Needs  "(12/17/2023)    PRAPARE - Transportation     Lack of Transportation (Medical): No     Lack of Transportation (Non-Medical): No   Physical Activity: Not on file   Stress: Not on file   Social Connections: Not on file   Intimate Partner Violence: Not on file   Housing Stability: Low Risk  (12/17/2023)    Housing Stability Vital Sign     Unable to Pay for Housing in the Last Year: No     Number of Places Lived in the Last Year: 1     Unstable Housing in the Last Year: No         I have personally reviewed the patient's social history in the records.  Rajesh Estrada MD    REVIEW OF SYSTEMS      14 point ROS was reviewed and negative except as noted above in HPI.      PHYSICAL EXAM    VITAL SIGNS:    /76 (BP Location: Right arm, Patient Position: Lying)   Pulse 99   Temp 36.9 °C (98.4 °F) (Oral)   Resp 18   Ht 1.753 m (5' 9\")   Wt 86.1 kg (189 lb 13.1 oz)   SpO2 97%   BMI 28.03 kg/m²    Review EMR for vital signs  Nursing note and vitals reviewed.    Constitutional:  Alert and oriented, well-developed, well-nourished, appears stated age, non-toxic appearing  HENT:  Normocephalic, atraumatic, bilateral external ears normal, oropharynx moist, Nose normal.  Neck: normal range of motion, no tenderness, supple, no stridor.  Eyes:  PERRL, EOMI, conjunctiva normal, no discharge.   Cardiovascular:  Normal heart rate, normal rhythm, no murmurs, no rubs, no gallops.   Respiratory:  Normal breath sounds, no respiratory distress, no wheezing, no chest wall tenderness.   GI:  Bowel sounds normal, soft, vague abdominal generalized tenderness, no rebound or guarding, no distention, no masses pulsatile or otherwise   (any female  exam was done with female chaperone present):   Deferred  Integument:  Warm, dry, no erythema, no rash, no edema.   Back:  No midline tenderness, no CVA tenderness.   Musculoskeletal:  Intact distal pulses, no tenderness, no cyanosis, no clubbing, with capillary refill less than 2 seconds. Good " range of motion in all major joints. No tenderness to palpation or major deformities noted.    Neurologic:  Alert & oriented x 3, normal motor function, normal sensory function, no focal deficits noted. Cranial nerves II-XII intact.  Psychiatric:  Affect normal, judgment normal, mood normal.     EKG  None         Reviewed and interpreted by me, Rajesh Estrada MD        CARDIAC MONITORING    Cardiac monitor reveals normal sinus rhythm as interpreted by me.   Cardiac monitor was ordered secondary to patient's history of chest pain/palpitations/near-syncope/syncope/sob/stroke and to monitor the patient for dysrhythmia.      RADIOLOGY  CT abdomen pelvis w IV contrast   Final Result   Multiple prominent mesenteric lymph nodes again noted in the right   lower abdomen near the terminal ileum. Findings are nonspecific and   can be seen with infectious or inflammatory etiologies.        MACRO:   none        Signed by: Herb Trevino 12/15/2023 2:32 PM   Dictation workstation:   [id number]          All Imaging studies evaluated and interpreted by ED physician except when noted otherwise.    ED PROVIDER INTERPRETATION (XRAYS ONLY):       *I have interpreted the x-ray real-time in the ED myself, and made a clinical decision on it prior to the formal radiology reading.    Rajesh Estrada M.D.    RADIOLOGIST IMPRESSION (U/S, CT, MRI):   CT abdomen pelvis w IV contrast   Final Result   Multiple prominent mesenteric lymph nodes again noted in the right   lower abdomen near the terminal ileum. Findings are nonspecific and   can be seen with infectious or inflammatory etiologies.        MACRO:   none        Signed by: Herb Trevino 12/15/2023 2:32 PM   Dictation workstation:   [id number]            PERTINENT LABS    Please refer to the chart for all lab work and to MDM for relevant discussion.      PROCEDURE    None (procdoc)    ED COURSE & MEDICAL DECISION MAKING    Pertinent Labs & Imaging studies reviewed. (See chart for  details)    MDM:    Assessment: Prem Mirza is a 33 y.o.male who presents to the ED with  vague generalized abdominal tenderness in the setting of Crohn's disease with patient refusing any immunosuppressants and has not wanted to take any medications but not seen PCP recommended repeat visit so obtain a lactic acid as well as CBC chemistry hepatic function panel lipase and urinalysis as well as a repeat CT abdomen pelvis with IV contrast to make sure there have not been any changes and we will discuss the case with Dr. Kapoor regarding neck step while providing morphine Zofran Toradol and IV fluid for his relief.  Patient understands and agrees with plan        Prior records in EPIC reviewed by me.     2023 Coding Requirements:  --Independent historian(s):    see HPI  --Review of prior records:    EHR reviewed   --Relevant comorbidities:    see records  --Social determinants of health:        MALE ABDOMINAL PAIN  I have considered the following conditions during   my assessment of this patient: Abdominal pain, flank pain, vomiting, diarrhea,   gastritis, colitis, ulcer, abdominal aortic aneurysm, acute coronary syndrome ,   myocardial infarction acute, appendicitis, bowel obstruction, hernia, cholecystitis,   Clostridium difficile associated disease, diverticulitis, ischemic colitis, mesenteric   ischemia, pancreatitis, pyelonephritis, sepsis, testicular torsion, ureterolithiasis,   UTI.      Only slight white count elevation noted, slightly reduced compared to a few days ago at 13.7    Lactic acid otherwise within normal limits.    Lipase chemistry and LFTs within normal limits with urinalysis and CT scan pending.    Signed out to Dr. Rubio pending CT and urinalysis results and final disposition            ED VITALS  Vitals:    12/16/23 1544 12/16/23 1900 12/17/23 0716 12/17/23 1542   BP: 129/63 110/62 104/53 149/76   BP Location: Right arm Right arm Left arm Right arm   Patient Position: Lying Lying Lying  Lying   Pulse: 76 87 56 99   Resp: 20 20 18 18   Temp: 36.7 °C (98.1 °F) 36.8 °C (98.2 °F) 36.7 °C (98.1 °F) 36.9 °C (98.4 °F)   TempSrc: Oral Oral Oral Oral   SpO2: 97% 97% 96% 97%   Weight:       Height:           No data recorded    As part of the 2022 [facility] reporting requirements, the following measures have been reviewed and documented:    None     1. Exacerbation of Crohn's disease without complication (CMS/HCC)    2. Generalized abdominal pain    3. History of Crohn's disease    4. Right sided abdominal pain        Diagnoses as of 01/17/24 1350   Generalized abdominal pain   History of Crohn's disease   Exacerbation of Crohn's disease without complication (CMS/HCC)         DISPOSITION     Transfer of care    Physician signing out: Natalie      Accepting physician: Ramiro      Signout time: 230 pm      See HPI, physical exam and records for the rest of the history      Pending: Urinalysis CT abdomen pelvis with IV contrast and final disposition          Electronically signed by MD Rajesh Ryder MD  12/15/23 1348       Rajesh Estrada MD  01/17/24 1350

## 2023-12-16 LAB
ALBUMIN SERPL-MCNC: 3.3 G/DL (ref 3.5–5)
ALP BLD-CCNC: 64 U/L (ref 35–125)
ALT SERPL-CCNC: 12 U/L (ref 5–40)
ANION GAP SERPL CALC-SCNC: 8 MMOL/L
AST SERPL-CCNC: 23 U/L (ref 5–40)
BILIRUB SERPL-MCNC: 0.7 MG/DL (ref 0.1–1.2)
BUN SERPL-MCNC: 5 MG/DL (ref 8–25)
C DIF TOX TCDA+TCDB STL QL NAA+PROBE: NOT DETECTED
CALCIUM SERPL-MCNC: 8.2 MG/DL (ref 8.5–10.4)
CHLORIDE SERPL-SCNC: 106 MMOL/L (ref 97–107)
CO2 SERPL-SCNC: 25 MMOL/L (ref 24–31)
CREAT SERPL-MCNC: 0.8 MG/DL (ref 0.4–1.6)
ERYTHROCYTE [DISTWIDTH] IN BLOOD BY AUTOMATED COUNT: 12.5 % (ref 11.5–14.5)
GFR SERPL CREATININE-BSD FRML MDRD: >90 ML/MIN/1.73M*2
GLUCOSE SERPL-MCNC: 94 MG/DL (ref 65–99)
HCT VFR BLD AUTO: 43.1 % (ref 41–52)
HGB BLD-MCNC: 14.3 G/DL (ref 13.5–17.5)
MCH RBC QN AUTO: 30.1 PG (ref 26–34)
MCHC RBC AUTO-ENTMCNC: 33.2 G/DL (ref 32–36)
MCV RBC AUTO: 91 FL (ref 80–100)
NRBC BLD-RTO: 0 /100 WBCS (ref 0–0)
PLATELET # BLD AUTO: 176 X10*3/UL (ref 150–450)
POTASSIUM SERPL-SCNC: 3.8 MMOL/L (ref 3.4–5.1)
PROT SERPL-MCNC: 6.1 G/DL (ref 5.9–7.9)
RBC # BLD AUTO: 4.75 X10*6/UL (ref 4.5–5.9)
SODIUM SERPL-SCNC: 139 MMOL/L (ref 133–145)
WBC # BLD AUTO: 9.7 X10*3/UL (ref 4.4–11.3)

## 2023-12-16 PROCEDURE — 99231 SBSQ HOSP IP/OBS SF/LOW 25: CPT | Performed by: INTERNAL MEDICINE

## 2023-12-16 PROCEDURE — 85027 COMPLETE CBC AUTOMATED: CPT | Performed by: INTERNAL MEDICINE

## 2023-12-16 PROCEDURE — 2500000002 HC RX 250 W HCPCS SELF ADMINISTERED DRUGS (ALT 637 FOR MEDICARE OP, ALT 636 FOR OP/ED): Performed by: EMERGENCY MEDICINE

## 2023-12-16 PROCEDURE — 1200000002 HC GENERAL ROOM WITH TELEMETRY DAILY

## 2023-12-16 PROCEDURE — 2500000004 HC RX 250 GENERAL PHARMACY W/ HCPCS (ALT 636 FOR OP/ED): Performed by: INTERNAL MEDICINE

## 2023-12-16 PROCEDURE — 83993 ASSAY FOR CALPROTECTIN FECAL: CPT | Performed by: NURSE PRACTITIONER

## 2023-12-16 PROCEDURE — 2500000004 HC RX 250 GENERAL PHARMACY W/ HCPCS (ALT 636 FOR OP/ED): Performed by: EMERGENCY MEDICINE

## 2023-12-16 PROCEDURE — 36415 COLL VENOUS BLD VENIPUNCTURE: CPT | Performed by: INTERNAL MEDICINE

## 2023-12-16 PROCEDURE — 80053 COMPREHEN METABOLIC PANEL: CPT | Performed by: INTERNAL MEDICINE

## 2023-12-16 PROCEDURE — 87493 C DIFF AMPLIFIED PROBE: CPT | Performed by: NURSE PRACTITIONER

## 2023-12-16 PROCEDURE — 87506 IADNA-DNA/RNA PROBE TQ 6-11: CPT | Mod: TRILAB | Performed by: NURSE PRACTITIONER

## 2023-12-16 PROCEDURE — 2500000001 HC RX 250 WO HCPCS SELF ADMINISTERED DRUGS (ALT 637 FOR MEDICARE OP): Performed by: INTERNAL MEDICINE

## 2023-12-16 PROCEDURE — S4991 NICOTINE PATCH NONLEGEND: HCPCS | Performed by: EMERGENCY MEDICINE

## 2023-12-16 RX ADMIN — Medication 1 PATCH: at 09:36

## 2023-12-16 RX ADMIN — MORPHINE SULFATE 4 MG: 4 INJECTION, SOLUTION INTRAMUSCULAR; INTRAVENOUS at 18:20

## 2023-12-16 RX ADMIN — DEXTROSE MONOHYDRATE, SODIUM CHLORIDE, AND POTASSIUM CHLORIDE 100 ML/HR: 50; 4.5; 1.49 INJECTION, SOLUTION INTRAVENOUS at 08:07

## 2023-12-16 RX ADMIN — MORPHINE SULFATE 4 MG: 4 INJECTION, SOLUTION INTRAMUSCULAR; INTRAVENOUS at 07:30

## 2023-12-16 RX ADMIN — MORPHINE SULFATE 4 MG: 4 INJECTION, SOLUTION INTRAMUSCULAR; INTRAVENOUS at 14:00

## 2023-12-16 RX ADMIN — Medication 3 MG: at 18:09

## 2023-12-16 RX ADMIN — DEXTROSE MONOHYDRATE, SODIUM CHLORIDE, AND POTASSIUM CHLORIDE 100 ML/HR: 50; 4.5; 1.49 INJECTION, SOLUTION INTRAVENOUS at 18:13

## 2023-12-16 SDOH — SOCIAL STABILITY: SOCIAL INSECURITY: DOES ANYONE TRY TO KEEP YOU FROM HAVING/CONTACTING OTHER FRIENDS OR DOING THINGS OUTSIDE YOUR HOME?: NO

## 2023-12-16 SDOH — SOCIAL STABILITY: SOCIAL INSECURITY: ABUSE: ADULT

## 2023-12-16 SDOH — SOCIAL STABILITY: SOCIAL INSECURITY: DO YOU FEEL UNSAFE GOING BACK TO THE PLACE WHERE YOU ARE LIVING?: NO

## 2023-12-16 SDOH — SOCIAL STABILITY: SOCIAL INSECURITY: HAVE YOU HAD THOUGHTS OF HARMING ANYONE ELSE?: NO

## 2023-12-16 SDOH — SOCIAL STABILITY: SOCIAL INSECURITY: WERE YOU ABLE TO COMPLETE ALL THE BEHAVIORAL HEALTH SCREENINGS?: YES

## 2023-12-16 SDOH — SOCIAL STABILITY: SOCIAL INSECURITY: ARE YOU OR HAVE YOU BEEN THREATENED OR ABUSED PHYSICALLY, EMOTIONALLY, OR SEXUALLY BY ANYONE?: NO

## 2023-12-16 SDOH — SOCIAL STABILITY: SOCIAL INSECURITY: DO YOU FEEL ANYONE HAS EXPLOITED OR TAKEN ADVANTAGE OF YOU FINANCIALLY OR OF YOUR PERSONAL PROPERTY?: NO

## 2023-12-16 SDOH — SOCIAL STABILITY: SOCIAL INSECURITY: HAS ANYONE EVER THREATENED TO HURT YOUR FAMILY OR YOUR PETS?: NO

## 2023-12-16 SDOH — SOCIAL STABILITY: SOCIAL INSECURITY: ARE THERE ANY APPARENT SIGNS OF INJURIES/BEHAVIORS THAT COULD BE RELATED TO ABUSE/NEGLECT?: NO

## 2023-12-16 ASSESSMENT — LIFESTYLE VARIABLES
SKIP TO QUESTIONS 9-10: 0
PRESCIPTION_ABUSE_PAST_12_MONTHS: NO
HOW OFTEN DURING THE LAST YEAR HAVE YOU HAD A FEELING OF GUILT OR REMORSE AFTER DRINKING: LESS THAN MONTHLY
SUBSTANCE_ABUSE_PAST_12_MONTHS: YES
HOW OFTEN DO YOU HAVE A DRINK CONTAINING ALCOHOL: 2-3 TIMES A WEEK
AUDIT-C TOTAL SCORE: 4
AUDIT TOTAL SCORE: 1
AUDIT TOTAL SCORE: 5
HOW OFTEN DO YOU HAVE 6 OR MORE DRINKS ON ONE OCCASION: LESS THAN MONTHLY
AUDIT-C TOTAL SCORE: 4
HOW OFTEN DURING THE LAST YEAR HAVE YOU NEEDED AN ALCOHOLIC DRINK FIRST THING IN THE MORNING TO GET YOURSELF GOING AFTER A NIGHT OF HEAVY DRINKING: NEVER
HOW OFTEN DURING THE LAST YEAR HAVE YOU FAILED TO DO WHAT WAS NORMALLY EXPECTED FROM YOU BECAUSE OF DRINKING: NEVER
HOW OFTEN DURING THE LAST YEAR HAVE YOU BEEN UNABLE TO REMEMBER WHAT HAPPENED THE NIGHT BEFORE BECAUSE YOU HAD BEEN DRINKING: NEVER
HOW OFTEN DURING THE LAST YEAR HAVE YOU FOUND THAT YOU WERE NOT ABLE TO STOP DRINKING ONCE YOU HAD STARTED: NEVER
HAS A RELATIVE, FRIEND, DOCTOR, OR ANOTHER HEALTH PROFESSIONAL EXPRESSED CONCERN ABOUT YOUR DRINKING OR SUGGESTED YOU CUT DOWN: NO
HAVE YOU OR SOMEONE ELSE BEEN INJURED AS A RESULT OF YOUR DRINKING: NO
HOW MANY STANDARD DRINKS CONTAINING ALCOHOL DO YOU HAVE ON A TYPICAL DAY: 1 OR 2

## 2023-12-16 ASSESSMENT — COGNITIVE AND FUNCTIONAL STATUS - GENERAL
MOBILITY SCORE: 24
PATIENT BASELINE BEDBOUND: NO
DAILY ACTIVITIY SCORE: 24
DAILY ACTIVITIY SCORE: 24
MOBILITY SCORE: 24

## 2023-12-16 ASSESSMENT — PAIN SCALES - GENERAL
PAINLEVEL_OUTOF10: 8
PAINLEVEL_OUTOF10: 0 - NO PAIN
PAINLEVEL_OUTOF10: 7

## 2023-12-16 ASSESSMENT — ENCOUNTER SYMPTOMS
BLOOD IN STOOL: 1
DIARRHEA: 1
CHILLS: 0
VOMITING: 0
NAUSEA: 0
FEVER: 0
ABDOMINAL PAIN: 1

## 2023-12-16 ASSESSMENT — ACTIVITIES OF DAILY LIVING (ADL)
WALKS IN HOME: INDEPENDENT
FEEDING YOURSELF: INDEPENDENT
HEARING - LEFT EAR: FUNCTIONAL
DRESSING YOURSELF: INDEPENDENT
JUDGMENT_ADEQUATE_SAFELY_COMPLETE_DAILY_ACTIVITIES: YES
TOILETING: INDEPENDENT
ADEQUATE_TO_COMPLETE_ADL: YES
GROOMING: INDEPENDENT
PATIENT'S MEMORY ADEQUATE TO SAFELY COMPLETE DAILY ACTIVITIES?: YES
HEARING - RIGHT EAR: FUNCTIONAL
BATHING: INDEPENDENT

## 2023-12-16 ASSESSMENT — PAIN DESCRIPTION - LOCATION: LOCATION: ABDOMEN

## 2023-12-16 ASSESSMENT — PAIN - FUNCTIONAL ASSESSMENT
PAIN_FUNCTIONAL_ASSESSMENT: 0-10
PAIN_FUNCTIONAL_ASSESSMENT: 0-10

## 2023-12-16 ASSESSMENT — PATIENT HEALTH QUESTIONNAIRE - PHQ9
1. LITTLE INTEREST OR PLEASURE IN DOING THINGS: NEARLY EVERY DAY
2. FEELING DOWN, DEPRESSED OR HOPELESS: NEARLY EVERY DAY
SUM OF ALL RESPONSES TO PHQ9 QUESTIONS 1 & 2: 6

## 2023-12-16 ASSESSMENT — PAIN DESCRIPTION - ORIENTATION: ORIENTATION: RIGHT

## 2023-12-16 ASSESSMENT — PAIN DESCRIPTION - DESCRIPTORS: DESCRIPTORS: CRAMPING;SHARP

## 2023-12-16 NOTE — H&P
History Of Present Illness  Prem Mirza is a 33 y.o. male presenting with abdominal pain.  In February patient presented with 3 weeks of chronic diarrhea abnormal CAT scan showing possibility of colitis.  His stool cultures were negative.  Had colonoscopy done.  Path results showed fragments of colonic mucosa with focal active inflammation overlying lymphoid aggregates.  Patient never followed up with GI to have proper diagnosis made or treatment started.  He was feeling better but again he started having severe abdominal pain and diarrhea since Sunday.  He came to the emergency room with the pain CAT scan done not much findings so he was discharged.  He went to see the PCP and was doubling in pain.  He was referred to emergency room.  Patient has mild leukocytosis and CAT scan showed nonspecific lymph nodes.  Patient was admitted for suspected colitis flareup     past Medical History  History reviewed. No pertinent past medical history.    Surgical History  History reviewed. No pertinent surgical history.     Social History  Smokes     family History  No family history of inflammatory bowel disease     Allergies  Amoxicillin-pot clavulanate, Cephalexin, and Azithromycin    Review of Systems   Constitutional:  Negative for activity change, appetite change, chills, diaphoresis, fatigue, fever and unexpected weight change.   HENT:  Negative for congestion, dental problem, drooling, ear discharge, ear pain, facial swelling, hearing loss, mouth sores, nosebleeds, postnasal drip, rhinorrhea, sinus pressure, sinus pain, sneezing, sore throat, tinnitus, trouble swallowing and voice change.    Eyes:  Negative for photophobia, pain, discharge, redness, itching and visual disturbance.   Respiratory:  Negative for apnea, cough, choking, chest tightness, shortness of breath, wheezing and stridor.    Cardiovascular:  Negative for chest pain, palpitations and leg swelling.   Gastrointestinal:  Positive for abdominal pain  and diarrhea. Negative for abdominal distention, anal bleeding, blood in stool, constipation, nausea, rectal pain and vomiting.   Endocrine: Negative for cold intolerance, heat intolerance, polydipsia, polyphagia and polyuria.   Genitourinary:  Negative for decreased urine volume, difficulty urinating, dysuria, enuresis, flank pain, frequency, genital sores, hematuria and urgency.   Musculoskeletal:  Negative for arthralgias, back pain, gait problem, joint swelling, myalgias, neck pain and neck stiffness.   Skin:  Negative for color change, pallor, rash and wound.   Allergic/Immunologic: Negative for environmental allergies, food allergies and immunocompromised state.   Neurological:  Negative for dizziness, tremors, seizures, syncope, facial asymmetry, speech difficulty, weakness, light-headedness, numbness and headaches.   Hematological:  Negative for adenopathy. Does not bruise/bleed easily.   Psychiatric/Behavioral:  Negative for agitation, behavioral problems, confusion, decreased concentration, dysphoric mood, hallucinations, self-injury, sleep disturbance and suicidal ideas. The patient is not nervous/anxious and is not hyperactive.         Physical Exam  Vitals reviewed.   Constitutional:       Appearance: Normal appearance.   HENT:      Head: Normocephalic and atraumatic.      Right Ear: Tympanic membrane, ear canal and external ear normal.      Left Ear: Tympanic membrane, ear canal and external ear normal.      Nose: Nose normal.      Mouth/Throat:      Pharynx: Oropharynx is clear.   Eyes:      Extraocular Movements: Extraocular movements intact.      Conjunctiva/sclera: Conjunctivae normal.      Pupils: Pupils are equal, round, and reactive to light.   Cardiovascular:      Rate and Rhythm: Normal rate and regular rhythm.      Pulses: Normal pulses.      Heart sounds: Normal heart sounds.   Pulmonary:      Effort: Pulmonary effort is normal.      Breath sounds: Normal breath sounds.   Abdominal:       "General: Abdomen is flat. Bowel sounds are normal.      Palpations: Abdomen is soft.      Tenderness: There is abdominal tenderness.   Musculoskeletal:      Cervical back: Normal range of motion and neck supple.   Skin:     General: Skin is warm and dry.   Neurological:      General: No focal deficit present.      Mental Status: He is alert and oriented to person, place, and time.   Psychiatric:         Mood and Affect: Mood normal.          Last Recorded Vitals  Blood pressure 154/76, pulse 89, temperature 36.6 °C (97.9 °F), temperature source Oral, resp. rate 18, height 1.753 m (5' 9\"), weight 86.1 kg (189 lb 13.1 oz), SpO2 98 %.    Relevant Results        Scheduled medications  docusate sodium, 100 mg, oral, BID  melatonin, 3 mg, oral, Daily  nicotine, 1 patch, transdermal, Daily  polyethylene glycol, 17 g, oral, Daily      Continuous medications  potassium jtdbgit-D2-1.45%NaCl, 100 mL/hr      PRN medications  PRN medications: acetaminophen **OR** acetaminophen **OR** acetaminophen, benzocaine-menthol, dextromethorphan-guaifenesin, guaiFENesin, morphine, ondansetron  Results for orders placed or performed during the hospital encounter of 12/15/23 (from the past 24 hour(s))   Basic Metabolic Panel   Result Value Ref Range    Glucose 147 (H) 65 - 99 mg/dL    Sodium 137 133 - 145 mmol/L    Potassium 3.6 3.4 - 5.1 mmol/L    Chloride 102 97 - 107 mmol/L    Bicarbonate 23 (L) 24 - 31 mmol/L    Urea Nitrogen 8 8 - 25 mg/dL    Creatinine 0.80 0.40 - 1.60 mg/dL    eGFR >90 >60 mL/min/1.73m*2    Calcium 8.8 8.5 - 10.4 mg/dL    Anion Gap 12 <=19 mmol/L   CBC   Result Value Ref Range    WBC 13.7 (H) 4.4 - 11.3 x10*3/uL    nRBC 0.0 0.0 - 0.0 /100 WBCs    RBC 5.24 4.50 - 5.90 x10*6/uL    Hemoglobin 15.5 13.5 - 17.5 g/dL    Hematocrit 45.9 41.0 - 52.0 %    MCV 88 80 - 100 fL    MCH 29.6 26.0 - 34.0 pg    MCHC 33.8 32.0 - 36.0 g/dL    RDW 12.7 11.5 - 14.5 %    Platelets 196 150 - 450 x10*3/uL   Hepatic Function Panel   Result " Value Ref Range    AST 25 5 - 40 U/L    ALT 15 5 - 40 U/L    Alkaline Phosphatase 75 35 - 125 U/L    Bilirubin, Total 0.6 0.1 - 1.2 mg/dL    Bilirubin, Direct <0.2 0.0 - 0.2 mg/dL    Total Protein 7.2 5.9 - 7.9 g/dL    Albumin 4.0 3.5 - 5.0 g/dL   Lipase   Result Value Ref Range    Lipase 24 16 - 63 U/L   Type And Screen   Result Value Ref Range    ABO TYPE A     Rh TYPE POS     ANTIBODY SCREEN NEG    Blood Gas Lactic Acid, Venous   Result Value Ref Range    POCT Lactate, Venous 1.2 0.4 - 2.0 mmol/L   Urinalysis with Reflex Microscopic   Result Value Ref Range    Color, Urine Light-Yellow Light-Yellow, Yellow, Dark-Yellow    Appearance, Urine Clear Clear    Specific Gravity, Urine 1.021 1.005 - 1.035    pH, Urine 7.0 5.0, 5.5, 6.0, 6.5, 7.0, 7.5, 8.0    Protein, Urine NEGATIVE NEGATIVE, 10 (TRACE), 20 (TRACE) mg/dL    Glucose, Urine Normal Normal mg/dL    Blood, Urine NEGATIVE NEGATIVE    Ketones, Urine NEGATIVE NEGATIVE mg/dL    Bilirubin, Urine NEGATIVE NEGATIVE    Urobilinogen, Urine Normal Normal mg/dL    Nitrite, Urine NEGATIVE NEGATIVE    Leukocyte Esterase, Urine NEGATIVE NEGATIVE     CT abdomen pelvis w IV contrast    Result Date: 12/15/2023  Interpreted By:  Herb Trevino, STUDY: CT ABDOMEN PELVIS W IV CONTRAST; 12/15/2023 2:05 pm   INDICATION: Signs/Symptoms:History of Crohn's, increasing abdominal pain since flareup 4 days ago when he had CT scan.   COMPARISON: December 12, 2023.   ACCESSION NUMBER(S): [id number]   ORDERING CLINICIAN: TATIANA WU   TECHNIQUE: Oral contrast was not administered. 75 ml Omnipaque 350 was injected intravenously. CT of the Abdomen and Pelvis with intravenous contrast was performed. Axial, sagittal and coronal reformatted images were reviewed.   All CT examinations are performed with 1 or more of the following dose reduction techniques: Automated exposure control, adjustment of mA and/or kv according to patient's size, or use of iterative reconstruction techniques.    FINDINGS: Lower Chest: Unremarkable.   Abdomen: Liver: Unremarkable. Bile Ducts: Normal caliber. Gallbladder: Unremarkable. Pancreas: Unremarkable. Spleen: Unremarkable. Adrenals: Normal. Kidneys: Normal.   Pelvis: No pelvic masses. Appendix: Normal. Bladder: Unremarkable.   Bowel: No bowel wall thickening or abnormal distention to suggest bowel obstruction.. Mesenteric Lymph Nodes: Multiple prominent mesenteric lymph nodes again noted in the right lower abdomen near the terminal ileum. Peritoneum: There is no free fluid or free intraperitoneal gas. There are no localized fluid collections. Vessels: Unremarkable Retroperitoneum: No retroperitoneal adenopathy. Abdominal Wall: Unremarkable. Bones: No acute bony abnormalities.       Multiple prominent mesenteric lymph nodes again noted in the right lower abdomen near the terminal ileum. Findings are nonspecific and can be seen with infectious or inflammatory etiologies.   MACRO: none   Signed by: Herb Trevino 12/15/2023 2:32 PM Dictation workstation:   [id number]        Assessment/Plan   Principal Problem:    Exacerbation of Crohn's disease without complication (CMS/HCC)      Patient has never been treated for Crohn's disease  Has not got diagnosis made of Crohn's disease yet by GI  Will treat with IV fluids and pain medication  Consult GI for further management       I spent  minutes in the professional and overall care of this patient.      Britni Kapoor MD

## 2023-12-16 NOTE — CONSULTS
"Nutrition Assessment Note    Nutrition Assessment    Reason for Assessment: Dietitian discretion (Crohn's disease)    Reason for Hospital Admission:  Prem Mirza is a 33 y.o. male who is admitted for abdominal pain.  In February patient presented with 3 weeks of chronic diarrhea abnormal CAT scan showing possibility of colitis.  His stool cultures were negative.  Had colonoscopy done.  Path results showed fragments of colonic mucosa with focal active inflammation overlying lymphoid aggregates.  Patient never followed up with GI to have proper diagnosis made or treatment started.     Pt stated he is aware of some foods to avoid, for example raw vegetables and spicy foods. Discussed foods to limit during flare-ups and outside of flare ups. Pt denied Nutritional supplements.     Nutrition History:  Food and Nutrient History: Pt stated that he has not eaten that well this week, mostly liquids. Pt stated the last solid thing he ate was a salad on Wednesday.     Food Allergies/Intolerances:  None  GI Symptoms: Diarrhea  Oral Problems: None    Anthropometrics:  Ht: 175.3 cm (5' 9\"), Wt: 86.1 kg (189 lb 13.1 oz), BMI: 28.02  IBW/kg (Dietitian Calculated): 72.73 kg          Weight Change:  Weight History / % Weight Change: Pt stated UBW is 178#. Pt stated that he lost 7# over 1 week           Nutrition Focused Physical Exam Findings:                     Nutrition Significant Labs:  Lab Results   Component Value Date    WBC 9.7 12/16/2023    HGB 14.3 12/16/2023    HCT 43.1 12/16/2023     12/16/2023    CHOL 106 (L) 04/01/2020    TRIG 73 04/01/2020    HDL 28 (L) 04/01/2020    ALT 12 12/16/2023    AST 23 12/16/2023     12/16/2023    K 3.8 12/16/2023     12/16/2023    CREATININE 0.80 12/16/2023    BUN 5 (L) 12/16/2023    CO2 25 12/16/2023    TSH 1.47 07/03/2019       Current Facility-Administered Medications:     acetaminophen (Tylenol) tablet 650 mg, 650 mg, oral, q4h PRN **OR** acetaminophen (Tylenol) " oral liquid 650 mg, 650 mg, oral, q4h PRN **OR** acetaminophen (Tylenol) suppository 650 mg, 650 mg, rectal, q4h PRN, Britni Kapoor MD    benzocaine-menthol (Cepastat Sore Throat) 15-3.6 mg lozenge 1 lozenge, 1 lozenge, Mouth/Throat, q2h PRN, Britni Kapoor MD    dextromethorphan-guaifenesin (Robitussin DM)  mg/5 mL oral liquid 5 mL, 5 mL, oral, q4h PRN, Britni Kapoor MD    dextrose 5 % and sodium chloride 0.45 % with KCl 20 mEq/L infusion, 100 mL/hr, intravenous, Continuous, Britni Kapoor MD, Last Rate: 100 mL/hr at 12/16/23 0807, 100 mL/hr at 12/16/23 0807    docusate sodium (Colace) capsule 100 mg, 100 mg, oral, BID, Britni Kapoor MD    guaiFENesin (Mucinex) 12 hr tablet 600 mg, 600 mg, oral, q12h PRN, Britni Kapoor MD    melatonin tablet 3 mg, 3 mg, oral, Daily, Britni Kapoor MD, 3 mg at 12/15/23 2159    morphine injection 4 mg, 4 mg, intravenous, q3h PRN, Rajesh Estrada MD, 4 mg at 12/16/23 0730    nicotine (Nicoderm CQ) 21 mg/24 hr patch 1 patch, 1 patch, transdermal, Daily, Opal CRESPO MD, 1 patch at 12/16/23 0936    ondansetron (Zofran) injection 4 mg, 4 mg, intravenous, q6h PRN, Rajesh Estrada MD, 4 mg at 12/15/23 1300    polyethylene glycol (Glycolax, Miralax) packet 17 g, 17 g, oral, Daily, Britni Kapoor MD    Dietary Orders (From admission, onward)       Start     Ordered    12/16/23 0903  Adult diet Low fiber  Diet effective now        Question:  Diet type  Answer:  Low fiber    12/16/23 0902                  Estimated Needs:   Estimated Energy Needs  Total Energy Estimated Needs (kCal): 1818 kCal  Total Estimated Energy Need per Day (kCal/kg): 25 kCal/kg  Method for Estimating Needs: IBW    Estimated Protein Needs  Total Protein Estimated Needs (g): 73 g  Total Protein Estimated Needs (g/kg): 1 g/kg  Method for Estimating Needs: IBW    Estimated Fluid Needs  Total Fluid Estimated Needs (mL): 1818 mL  Method for Estimating Needs: 1 mL/kcal        Nutrition Diagnosis   Nutrition Diagnosis:       Nutrition  Diagnosis  Patient has Nutrition Diagnosis: Yes  Diagnosis Status (1): New  Nutrition Diagnosis 1: Food and nutrition related knowledge deficit  Related to (1): Lack of prior exposure to accurate nutrition-related information  As Evidenced by (1): Conditions associated with diagnosis       Nutrition Interventions/Recommendations   Nutrition Interventions and Recommendations:    Nutrition Prescription:  Individualized Nutrition Prescription Provided for : 1818 kcals, 73 gm protein via Low fiber diet    Nutrition Interventions:   Food and/or Nutrient Delivery Interventions  Interventions: Meals and snacks  Meals and Snacks: Fiber-modified diet  Goal: Provide diet as ordered    Education Documentation  Nutrition Care Manual, taught by Liliya Canchola RDN, GEE at 12/16/2023  9:25 AM.  Learner: Patient  Readiness: Acceptance  Method: Explanation, Handout  Response: Verbalizes Understanding  Comment: Crohn's Disease nutrition therapy           Nutrition Monitoring and Evaluation   Monitoring/Evaluation:   Food/Nutrient Related History Monitoring  Monitoring and Evaluation Plan: Energy intake  Energy Intake: Estimated energy intake  Criteria: Pt to consume >/= 75% of estimated needs         Time Spent/Follow-up:   Follow Up  Time Spent (min): 30 minutes  Last Date of Nutrition Visit: 12/16/23  Nutrition Follow-Up Needed?: 3-5 days  Follow up Comment: 12/19/23

## 2023-12-16 NOTE — CONSULTS
Inpatient consult to gastroenterology  Consult performed by: Lin Mcwilliams, APRN-CNP  Consult ordered by: Britni Kapoor MD  Reason for consult: Diarrhea          Reason For Consult  Diarrhea    History Of Present Illness  Prem Mirza is a 33 y.o. male presenting with diarrhea and abdominal pain. Patient reports sharp left-sided abdominal pain earlier this week. Had a few episodes of bloody diarrhea as well. Chronic diarrhea about 4 x daily with 1-2x nightly. WBC is normal. Hgb normal. CT a/p showing prominent mesenteric lymph nodes but no significant bowel wall thickening or obstruction noted     He had similar episode in Feb 2023. Saw Dr Huizar. He did a colonoscopy without significant TI changes. Patient reports he was diagnosed with Crohns at that time but never started on any medication      Past Medical History  He has no past medical history on file.    Surgical History  He has no past surgical history on file.     Social History  He reports that he has been smoking cigarettes. He has a 18.00 pack-year smoking history. He has quit using smokeless tobacco.  His smokeless tobacco use included chew. He reports current alcohol use of about 4.0 standard drinks of alcohol per week. He reports current drug use. Frequency: 7.00 times per week. Drug: Marijuana.    Family History  No family history on file.     Allergies  Amoxicillin-pot clavulanate, Cephalexin, and Azithromycin    Review of Systems   Constitutional:  Negative for chills and fever.   Gastrointestinal:  Positive for abdominal pain, blood in stool and diarrhea. Negative for nausea and vomiting.        Physical Exam  Constitutional:       Appearance: Normal appearance.   HENT:      Head: Normocephalic and atraumatic.      Mouth/Throat:      Mouth: Mucous membranes are moist.   Pulmonary:      Effort: Pulmonary effort is normal.   Abdominal:      General: There is no distension.      Palpations: Abdomen is soft.      Tenderness: There is no  "abdominal tenderness. There is no guarding.   Musculoskeletal:         General: Normal range of motion.      Cervical back: Normal range of motion.   Skin:     General: Skin is warm and dry.   Neurological:      General: No focal deficit present.      Mental Status: He is alert. Mental status is at baseline.   Psychiatric:         Mood and Affect: Mood normal.          Last Recorded Vitals  Blood pressure 114/55, pulse 67, temperature 36.3 °C (97.3 °F), temperature source Oral, resp. rate 20, height 1.753 m (5' 9\"), weight 86.1 kg (189 lb 13.1 oz), SpO2 100 %.    Relevant Results  Results for orders placed or performed during the hospital encounter of 12/15/23 (from the past 24 hour(s))   Basic Metabolic Panel   Result Value Ref Range    Glucose 147 (H) 65 - 99 mg/dL    Sodium 137 133 - 145 mmol/L    Potassium 3.6 3.4 - 5.1 mmol/L    Chloride 102 97 - 107 mmol/L    Bicarbonate 23 (L) 24 - 31 mmol/L    Urea Nitrogen 8 8 - 25 mg/dL    Creatinine 0.80 0.40 - 1.60 mg/dL    eGFR >90 >60 mL/min/1.73m*2    Calcium 8.8 8.5 - 10.4 mg/dL    Anion Gap 12 <=19 mmol/L   CBC   Result Value Ref Range    WBC 13.7 (H) 4.4 - 11.3 x10*3/uL    nRBC 0.0 0.0 - 0.0 /100 WBCs    RBC 5.24 4.50 - 5.90 x10*6/uL    Hemoglobin 15.5 13.5 - 17.5 g/dL    Hematocrit 45.9 41.0 - 52.0 %    MCV 88 80 - 100 fL    MCH 29.6 26.0 - 34.0 pg    MCHC 33.8 32.0 - 36.0 g/dL    RDW 12.7 11.5 - 14.5 %    Platelets 196 150 - 450 x10*3/uL   Hepatic Function Panel   Result Value Ref Range    AST 25 5 - 40 U/L    ALT 15 5 - 40 U/L    Alkaline Phosphatase 75 35 - 125 U/L    Bilirubin, Total 0.6 0.1 - 1.2 mg/dL    Bilirubin, Direct <0.2 0.0 - 0.2 mg/dL    Total Protein 7.2 5.9 - 7.9 g/dL    Albumin 4.0 3.5 - 5.0 g/dL   Lipase   Result Value Ref Range    Lipase 24 16 - 63 U/L   Type And Screen   Result Value Ref Range    ABO TYPE A     Rh TYPE POS     ANTIBODY SCREEN NEG    Blood Gas Lactic Acid, Venous   Result Value Ref Range    POCT Lactate, Venous 1.2 0.4 - 2.0 " mmol/L   Urinalysis with Reflex Microscopic   Result Value Ref Range    Color, Urine Light-Yellow Light-Yellow, Yellow, Dark-Yellow    Appearance, Urine Clear Clear    Specific Gravity, Urine 1.021 1.005 - 1.035    pH, Urine 7.0 5.0, 5.5, 6.0, 6.5, 7.0, 7.5, 8.0    Protein, Urine NEGATIVE NEGATIVE, 10 (TRACE), 20 (TRACE) mg/dL    Glucose, Urine Normal Normal mg/dL    Blood, Urine NEGATIVE NEGATIVE    Ketones, Urine NEGATIVE NEGATIVE mg/dL    Bilirubin, Urine NEGATIVE NEGATIVE    Urobilinogen, Urine Normal Normal mg/dL    Nitrite, Urine NEGATIVE NEGATIVE    Leukocyte Esterase, Urine NEGATIVE NEGATIVE   CBC   Result Value Ref Range    WBC 9.7 4.4 - 11.3 x10*3/uL    nRBC 0.0 0.0 - 0.0 /100 WBCs    RBC 4.75 4.50 - 5.90 x10*6/uL    Hemoglobin 14.3 13.5 - 17.5 g/dL    Hematocrit 43.1 41.0 - 52.0 %    MCV 91 80 - 100 fL    MCH 30.1 26.0 - 34.0 pg    MCHC 33.2 32.0 - 36.0 g/dL    RDW 12.5 11.5 - 14.5 %    Platelets 176 150 - 450 x10*3/uL   Comprehensive metabolic panel   Result Value Ref Range    Glucose 94 65 - 99 mg/dL    Sodium 139 133 - 145 mmol/L    Potassium 3.8 3.4 - 5.1 mmol/L    Chloride 106 97 - 107 mmol/L    Bicarbonate 25 24 - 31 mmol/L    Urea Nitrogen 5 (L) 8 - 25 mg/dL    Creatinine 0.80 0.40 - 1.60 mg/dL    eGFR >90 >60 mL/min/1.73m*2    Calcium 8.2 (L) 8.5 - 10.4 mg/dL    Albumin 3.3 (L) 3.5 - 5.0 g/dL    Alkaline Phosphatase 64 35 - 125 U/L    Total Protein 6.1 5.9 - 7.9 g/dL    AST 23 5 - 40 U/L    Bilirubin, Total 0.7 0.1 - 1.2 mg/dL    ALT 12 5 - 40 U/L    Anion Gap 8 <=19 mmol/L     CT abdomen pelvis w IV contrast    Result Date: 12/15/2023  Interpreted By:  Herb Trevino, STUDY: CT ABDOMEN PELVIS W IV CONTRAST; 12/15/2023 2:05 pm   INDICATION: Signs/Symptoms:History of Crohn's, increasing abdominal pain since flareup 4 days ago when he had CT scan.   COMPARISON: December 12, 2023.   ACCESSION NUMBER(S): [id number]   ORDERING CLINICIAN: TATIANA WU   TECHNIQUE: Oral contrast was not administered.  75 ml Omnipaque 350 was injected intravenously. CT of the Abdomen and Pelvis with intravenous contrast was performed. Axial, sagittal and coronal reformatted images were reviewed.   All CT examinations are performed with 1 or more of the following dose reduction techniques: Automated exposure control, adjustment of mA and/or kv according to patient's size, or use of iterative reconstruction techniques.   FINDINGS: Lower Chest: Unremarkable.   Abdomen: Liver: Unremarkable. Bile Ducts: Normal caliber. Gallbladder: Unremarkable. Pancreas: Unremarkable. Spleen: Unremarkable. Adrenals: Normal. Kidneys: Normal.   Pelvis: No pelvic masses. Appendix: Normal. Bladder: Unremarkable.   Bowel: No bowel wall thickening or abnormal distention to suggest bowel obstruction.. Mesenteric Lymph Nodes: Multiple prominent mesenteric lymph nodes again noted in the right lower abdomen near the terminal ileum. Peritoneum: There is no free fluid or free intraperitoneal gas. There are no localized fluid collections. Vessels: Unremarkable Retroperitoneum: No retroperitoneal adenopathy. Abdominal Wall: Unremarkable. Bones: No acute bony abnormalities.       Multiple prominent mesenteric lymph nodes again noted in the right lower abdomen near the terminal ileum. Findings are nonspecific and can be seen with infectious or inflammatory etiologies.   MACRO: none   Signed by: Herb Trevino 12/15/2023 2:32 PM Dictation workstation:   [id number]    CT abdomen pelvis w IV contrast    Result Date: 12/12/2023  Interpreted By:  Samir Lyons, STUDY: CT ABDOMEN PELVIS W IV CONTRAST;  12/12/2023 1:50 pm   INDICATION: Signs/Symptoms:LUQ pain.   COMPARISON: None.   ACCESSION NUMBER(S): [id number]   ORDERING CLINICIAN: JOAQUIN HIGH   TECHNIQUE: CT of the abdomen and pelvis was performed.  Standard contiguous axial images were obtained at 3 mm slice thickness through the abdomen and pelvis. Coronal and sagittal reconstructions at 3 mm slice thickness  were performed.   75 mL of Omnipaque were administered intravenously without immediate complication.   FINDINGS: LOWER CHEST: No focal airspace consolidations or effusions   ABDOMEN:   LIVER: Normal size. No focal lesions.   BILE DUCTS: Nondilated.   GALLBLADDER: No hyperdense stones. No wall thickening.   PANCREAS: No focal lesions.   SPLEEN: Normal size. No focal lesions.   ADRENAL GLANDS: Within normal limits.   KIDNEYS AND URETERS: No stones. No hydronephrosis.   PELVIS:   BLADDER: Within normal limits.   REPRODUCTIVE ORGANS: No suspicious findings.   BOWEL: The stomach, small and large bowel are unremarkable.   VESSELS: IVC and aorta are normal.   PERITONEUM/RETROPERITONEUM/LYMPH NODES: There are multiple prominent, subcentimeter lymph nodes involving the right hemiabdomen. Example is measured on series 3, image 76, measuring 1.3 x 1.0 cm.   BONES AND ABDOMINAL WALL: No acute findings.       There are multiple prominent, subcentimeter lymph nodes involving the right hemiabdomen. This is nonspecific, and may represent an infectious or inflammatory etiology.   MACRO: None   Signed by: Samir Lyons 12/12/2023 3:03 PM Dictation workstation:   [id number]    XR chest 2 views    Result Date: 12/12/2023  Interpreted By:  Samir Lyons, STUDY: XR CHEST 2 VIEWS;  12/12/2023 2:02 pm   INDICATION: Signs/Symptoms:L rib pain.   COMPARISON: 01/30/2017.   ACCESSION NUMBER(S): [id number]   ORDERING CLINICIAN: JOAQUIN HIGH   FINDINGS:         CARDIOMEDIASTINAL SILHOUETTE: Cardiomediastinal silhouette is normal in size and configuration.   LUNGS: Lungs are clear.   ABDOMEN: No remarkable upper abdominal findings.   BONES: No acute osseous changes.       No evidence of acute cardiopulmonary process.     MACRO: None   Signed by: Samir Lyons 12/12/2023 2:59 PM Dictation workstation:   [id number]        Assessment/Plan     Diarrhea, Abdominal Pain, Abnormal CT   -Reviewed prior GI records per Dr Huizar. Colonoscopy Feb 2023 without TI  inflammation or any obvious colitis at that time. Patient reports being diagnosed with Crohns but no meds started   -Send infectious stool studies  -Fecal Calprotectin   -No obstruction or obvious wall thickening on CT. I don't think steroids are needed at this time. Will discuss with attending   -Diet as tolerated     If able to tolerate a regular diet, he can be discharged with plans for close outpatient GI follow up     I spent 30 minutes in the professional and overall care of this patient.

## 2023-12-16 NOTE — CARE PLAN
The patient's goals for the shift include      The clinical goals for the shift include    Problem: Pain  Goal: Takes deep breaths with improved pain control throughout the shift  Outcome: Progressing  Goal: Turns in bed with improved pain control throughout the shift  Outcome: Progressing  Goal: Walks with improved pain control throughout the shift  Outcome: Progressing  Goal: Performs ADL's with improved pain control throughout shift  Outcome: Progressing  Goal: Participates in PT with improved pain control throughout the shift  Outcome: Progressing  Goal: Free from opioid side effects throughout the shift  Outcome: Progressing  Goal: Free from acute confusion related to pain meds throughout the shift  Outcome: Progressing

## 2023-12-16 NOTE — PROGRESS NOTES
"Prem Mirza is a 33 y.o. male on day 1 of admission presenting with Exacerbation of Crohn's disease without complication (CMS/HCC).    Subjective   Still complaining of abdominal pain       Objective     Physical Exam  Vitals reviewed.   Constitutional:       Appearance: Normal appearance.   HENT:      Head: Normocephalic and atraumatic.      Right Ear: Tympanic membrane, ear canal and external ear normal.      Left Ear: Tympanic membrane, ear canal and external ear normal.      Nose: Nose normal.      Mouth/Throat:      Pharynx: Oropharynx is clear.   Eyes:      Extraocular Movements: Extraocular movements intact.      Conjunctiva/sclera: Conjunctivae normal.      Pupils: Pupils are equal, round, and reactive to light.   Cardiovascular:      Rate and Rhythm: Normal rate and regular rhythm.      Pulses: Normal pulses.      Heart sounds: Normal heart sounds.   Pulmonary:      Effort: Pulmonary effort is normal.      Breath sounds: Normal breath sounds.   Abdominal:      General: Abdomen is flat. Bowel sounds are normal.      Palpations: Abdomen is soft.      Tenderness: There is abdominal tenderness.   Musculoskeletal:      Cervical back: Normal range of motion and neck supple.   Skin:     General: Skin is warm and dry.   Neurological:      General: No focal deficit present.      Mental Status: He is alert and oriented to person, place, and time.   Psychiatric:         Mood and Affect: Mood normal.         Last Recorded Vitals  Blood pressure 114/55, pulse 67, temperature 36.3 °C (97.3 °F), temperature source Oral, resp. rate 20, height 1.753 m (5' 9\"), weight 86.1 kg (189 lb 13.1 oz), SpO2 100 %.  Intake/Output last 3 Shifts:  I/O last 3 completed shifts:  In: 1960 (22.8 mL/kg) [P.O.:960; IV Piggyback:1000]  Out: - (0 mL/kg)   Weight: 86.1 kg     Relevant Results              Scheduled medications  docusate sodium, 100 mg, oral, BID  melatonin, 3 mg, oral, Daily  nicotine, 1 patch, transdermal, " Daily  polyethylene glycol, 17 g, oral, Daily      Continuous medications  potassium oariypf-K0-5.45%NaCl, 100 mL/hr, Last Rate: 100 mL/hr (12/16/23 1115)      PRN medications  PRN medications: acetaminophen **OR** acetaminophen **OR** acetaminophen, benzocaine-menthol, dextromethorphan-guaifenesin, guaiFENesin, morphine, ondansetron  Results for orders placed or performed during the hospital encounter of 12/15/23 (from the past 24 hour(s))   CBC   Result Value Ref Range    WBC 9.7 4.4 - 11.3 x10*3/uL    nRBC 0.0 0.0 - 0.0 /100 WBCs    RBC 4.75 4.50 - 5.90 x10*6/uL    Hemoglobin 14.3 13.5 - 17.5 g/dL    Hematocrit 43.1 41.0 - 52.0 %    MCV 91 80 - 100 fL    MCH 30.1 26.0 - 34.0 pg    MCHC 33.2 32.0 - 36.0 g/dL    RDW 12.5 11.5 - 14.5 %    Platelets 176 150 - 450 x10*3/uL   Comprehensive metabolic panel   Result Value Ref Range    Glucose 94 65 - 99 mg/dL    Sodium 139 133 - 145 mmol/L    Potassium 3.8 3.4 - 5.1 mmol/L    Chloride 106 97 - 107 mmol/L    Bicarbonate 25 24 - 31 mmol/L    Urea Nitrogen 5 (L) 8 - 25 mg/dL    Creatinine 0.80 0.40 - 1.60 mg/dL    eGFR >90 >60 mL/min/1.73m*2    Calcium 8.2 (L) 8.5 - 10.4 mg/dL    Albumin 3.3 (L) 3.5 - 5.0 g/dL    Alkaline Phosphatase 64 35 - 125 U/L    Total Protein 6.1 5.9 - 7.9 g/dL    AST 23 5 - 40 U/L    Bilirubin, Total 0.7 0.1 - 1.2 mg/dL    ALT 12 5 - 40 U/L    Anion Gap 8 <=19 mmol/L                    Assessment/Plan   Principal Problem:    Exacerbation of Crohn's disease without complication (CMS/HCC)    White cell count normal  GI input noted  Further treatment per GI       I spent  minutes in the professional and overall care of this patient.      Britni Kapoor MD

## 2023-12-17 VITALS
SYSTOLIC BLOOD PRESSURE: 149 MMHG | DIASTOLIC BLOOD PRESSURE: 76 MMHG | WEIGHT: 189.82 LBS | RESPIRATION RATE: 18 BRPM | OXYGEN SATURATION: 97 % | HEART RATE: 99 BPM | BODY MASS INDEX: 28.11 KG/M2 | HEIGHT: 69 IN | TEMPERATURE: 98.4 F

## 2023-12-17 LAB

## 2023-12-17 PROCEDURE — 2500000004 HC RX 250 GENERAL PHARMACY W/ HCPCS (ALT 636 FOR OP/ED): Performed by: EMERGENCY MEDICINE

## 2023-12-17 PROCEDURE — 2500000002 HC RX 250 W HCPCS SELF ADMINISTERED DRUGS (ALT 637 FOR MEDICARE OP, ALT 636 FOR OP/ED): Performed by: EMERGENCY MEDICINE

## 2023-12-17 PROCEDURE — 2500000004 HC RX 250 GENERAL PHARMACY W/ HCPCS (ALT 636 FOR OP/ED): Performed by: INTERNAL MEDICINE

## 2023-12-17 PROCEDURE — S4991 NICOTINE PATCH NONLEGEND: HCPCS | Performed by: EMERGENCY MEDICINE

## 2023-12-17 PROCEDURE — 99238 HOSP IP/OBS DSCHRG MGMT 30/<: CPT | Performed by: INTERNAL MEDICINE

## 2023-12-17 RX ORDER — ACETAMINOPHEN 325 MG/1
650 TABLET ORAL EVERY 4 HOURS PRN
Qty: 30 TABLET | Refills: 0 | Status: SHIPPED | OUTPATIENT
Start: 2023-12-17

## 2023-12-17 RX ADMIN — ACETAMINOPHEN 650 MG: 325 TABLET ORAL at 14:18

## 2023-12-17 RX ADMIN — Medication 1 PATCH: at 08:59

## 2023-12-17 RX ADMIN — DEXTROSE MONOHYDRATE, SODIUM CHLORIDE, AND POTASSIUM CHLORIDE 100 ML/HR: 50; 4.5; 1.49 INJECTION, SOLUTION INTRAVENOUS at 04:19

## 2023-12-17 RX ADMIN — DEXTROSE MONOHYDRATE, SODIUM CHLORIDE, AND POTASSIUM CHLORIDE 100 ML/HR: 50; 4.5; 1.49 INJECTION, SOLUTION INTRAVENOUS at 14:21

## 2023-12-17 RX ADMIN — MORPHINE SULFATE 4 MG: 4 INJECTION, SOLUTION INTRAMUSCULAR; INTRAVENOUS at 00:08

## 2023-12-17 RX ADMIN — MORPHINE SULFATE 4 MG: 4 INJECTION, SOLUTION INTRAMUSCULAR; INTRAVENOUS at 15:24

## 2023-12-17 RX ADMIN — MORPHINE SULFATE 4 MG: 4 INJECTION, SOLUTION INTRAMUSCULAR; INTRAVENOUS at 09:01

## 2023-12-17 RX ADMIN — MORPHINE SULFATE 4 MG: 4 INJECTION, SOLUTION INTRAMUSCULAR; INTRAVENOUS at 03:15

## 2023-12-17 RX ADMIN — MORPHINE SULFATE 4 MG: 4 INJECTION, SOLUTION INTRAMUSCULAR; INTRAVENOUS at 18:04

## 2023-12-17 RX ADMIN — ACETAMINOPHEN 650 MG: 325 TABLET ORAL at 09:01

## 2023-12-17 RX ADMIN — MORPHINE SULFATE 4 MG: 4 INJECTION, SOLUTION INTRAMUSCULAR; INTRAVENOUS at 12:24

## 2023-12-17 RX ADMIN — ACETAMINOPHEN 650 MG: 325 TABLET ORAL at 18:04

## 2023-12-17 ASSESSMENT — PAIN SCALES - GENERAL
PAINLEVEL_OUTOF10: 6
PAINLEVEL_OUTOF10: 4
PAINLEVEL_OUTOF10: 7
PAINLEVEL_OUTOF10: 4
PAINLEVEL_OUTOF10: 7
PAINLEVEL_OUTOF10: 5 - MODERATE PAIN
PAINLEVEL_OUTOF10: 9
PAINLEVEL_OUTOF10: 5 - MODERATE PAIN
PAINLEVEL_OUTOF10: 4
PAINLEVEL_OUTOF10: 4
PAINLEVEL_OUTOF10: 6
PAINLEVEL_OUTOF10: 6
PAINLEVEL_OUTOF10: 7

## 2023-12-17 ASSESSMENT — PAIN DESCRIPTION - LOCATION
LOCATION: ABDOMEN

## 2023-12-17 ASSESSMENT — PAIN DESCRIPTION - ORIENTATION
ORIENTATION: RIGHT
ORIENTATION: RIGHT;UPPER
ORIENTATION: RIGHT;UPPER
ORIENTATION: MID
ORIENTATION: RIGHT;UPPER
ORIENTATION: RIGHT;UPPER

## 2023-12-17 ASSESSMENT — ACTIVITIES OF DAILY LIVING (ADL): LACK_OF_TRANSPORTATION: NO

## 2023-12-17 NOTE — CARE PLAN
Problem: Pain  Goal: Takes deep breaths with improved pain control throughout the shift  Outcome: Progressing  Goal: Turns in bed with improved pain control throughout the shift  Outcome: Progressing  Goal: Walks with improved pain control throughout the shift  Outcome: Progressing  Goal: Performs ADL's with improved pain control throughout shift  Outcome: Progressing  Goal: Participates in PT with improved pain control throughout the shift  Outcome: Progressing  Goal: Free from opioid side effects throughout the shift  Outcome: Progressing  Goal: Free from acute confusion related to pain meds throughout the shift  Outcome: Progressing   The patient's goals for the shift include      The clinical goals for the shift include pain management    Over the shift, the patient did not make progress toward the following goals. Barriers to progression include . Recommendations to address these barriers include .

## 2023-12-17 NOTE — NURSING NOTE
Reviewed discharge instructions. All questions answered. IV removed. Ensured patient had gathered all belongings. Escorted patient down to ride.

## 2023-12-17 NOTE — DISCHARGE SUMMARY
Discharge Diagnosis  Right sided abdominal pain    Issues Requiring Follow-Up  Abdominal pain  Diarrhea    Test Results Pending At Discharge  Pending Labs       Order Current Status    Calprotectin Stool In process    Stool Pathogen Panel, PCR In process            Hospital Course   Prem Mirza is a 33 y.o. male presenting with abdominal pain.  In February patient presented with 3 weeks of chronic diarrhea abnormal CAT scan showing possibility of colitis.  His stool cultures were negative.  Had colonoscopy done.  Path results showed fragments of colonic mucosa with focal active inflammation overlying lymphoid aggregates.  Patient never followed up with GI to have proper diagnosis made or treatment started.  He was feeling better but again he started having severe abdominal pain and diarrhea since Sunday.  He came to the emergency room with the pain CAT scan done not much findings so he was discharged.  He went to see the PCP and was doubling in pain.  He was referred to emergency room.  Patient has mild leukocytosis and CAT scan showed nonspecific lymph nodes.  Patient was admitted for suspected colitis flareup GI was consulted.  Infectious etiology is ruled out again.    No definite diagnosis of Crohn's was made at this point.    Fecal calprotectin results pending.  Patient advised to follow-up outpatient with GI    Pertinent Physical Exam At Time of Discharge  Physical Exam  Vitals reviewed.   Constitutional:       Appearance: Normal appearance.   HENT:      Head: Normocephalic and atraumatic.      Right Ear: Tympanic membrane, ear canal and external ear normal.      Left Ear: Tympanic membrane, ear canal and external ear normal.      Nose: Nose normal.      Mouth/Throat:      Pharynx: Oropharynx is clear.   Eyes:      Extraocular Movements: Extraocular movements intact.      Conjunctiva/sclera: Conjunctivae normal.      Pupils: Pupils are equal, round, and reactive to light.   Cardiovascular:      Rate and  Rhythm: Normal rate and regular rhythm.      Pulses: Normal pulses.      Heart sounds: Normal heart sounds.   Pulmonary:      Effort: Pulmonary effort is normal.      Breath sounds: Normal breath sounds.   Abdominal:      General: Abdomen is flat. Bowel sounds are normal.      Palpations: Abdomen is soft.   Musculoskeletal:      Cervical back: Normal range of motion and neck supple.   Skin:     General: Skin is warm and dry.   Neurological:      General: No focal deficit present.      Mental Status: He is alert and oriented to person, place, and time.   Psychiatric:         Mood and Affect: Mood normal.         Home Medications     Medication List      START taking these medications     acetaminophen 325 mg tablet; Commonly known as: Tylenol; Take 2 tablets   (650 mg) by mouth every 4 hours if needed for mild pain (1 - 3).     CONTINUE taking these medications     ciprofloxacin 500 mg tablet; Commonly known as: Cipro; Take 1 tablet   (500 mg) by mouth 2 times a day for 7 days.   dicyclomine 10 mg capsule; Commonly known as: Bentyl   metroNIDAZOLE 500 mg tablet; Commonly known as: FlagyL; Take 1 tablet   (500 mg) by mouth 3 times a day for 7 days.   omeprazole 40 mg DR capsule; Commonly known as: PriLOSEC     STOP taking these medications     nabumetone 750 mg tablet; Commonly known as: Relafen       Outpatient Follow-Up  Follow-up with GI    Britni Kapoor MD

## 2023-12-17 NOTE — CARE PLAN
Problem: Pain  Goal: Takes deep breaths with improved pain control throughout the shift  12/17/2023 1441 by Yainque Guillen RN  Outcome: Progressing  12/17/2023 1441 by Yanique Guillen RN  Outcome: Progressing  Goal: Turns in bed with improved pain control throughout the shift  12/17/2023 1441 by Yanique Guillen RN  Outcome: Progressing  12/17/2023 1441 by Yanique Guillen RN  Outcome: Progressing  Goal: Walks with improved pain control throughout the shift  12/17/2023 1441 by Yanique Guillen RN  Outcome: Progressing  12/17/2023 1441 by Yanique Guillen RN  Outcome: Progressing  Goal: Performs ADL's with improved pain control throughout shift  12/17/2023 1441 by Yanique Guillen RN  Outcome: Progressing  12/17/2023 1441 by Yanique Gulilen RN  Outcome: Progressing  Goal: Participates in PT with improved pain control throughout the shift  12/17/2023 1441 by Yanique Guillen RN  Outcome: Progressing  12/17/2023 1441 by Yanique Guillen RN  Outcome: Progressing  Goal: Free from opioid side effects throughout the shift  12/17/2023 1441 by Yanique Guillen RN  Outcome: Progressing  12/17/2023 1441 by Yanique Guillen RN  Outcome: Progressing  Goal: Free from acute confusion related to pain meds throughout the shift  12/17/2023 1441 by Yanique Guillen RN  Outcome: Progressing  12/17/2023 1441 by Yanique Guillen RN  Outcome: Progressing

## 2023-12-17 NOTE — PROGRESS NOTES
"Prem Mirza is a 33 y.o. male on day 2 of admission presenting with Exacerbation of Crohn's disease without complication (CMS/HCC).    Subjective   No diarrhea since yesterday evening. Still with pain but able to eat        Objective     Physical Exam  Constitutional:       Appearance: Normal appearance.   HENT:      Head: Normocephalic and atraumatic.      Mouth/Throat:      Mouth: Mucous membranes are moist.   Pulmonary:      Effort: Pulmonary effort is normal.   Abdominal:      General: There is no distension.      Palpations: Abdomen is soft.      Tenderness: There is abdominal tenderness. There is no guarding.   Musculoskeletal:         General: Normal range of motion.      Cervical back: Normal range of motion.   Skin:     General: Skin is warm and dry.   Neurological:      General: No focal deficit present.      Mental Status: He is alert. Mental status is at baseline.   Psychiatric:         Mood and Affect: Mood normal.         Last Recorded Vitals  Blood pressure 104/53, pulse 56, temperature 36.7 °C (98.1 °F), temperature source Oral, resp. rate 18, height 1.753 m (5' 9\"), weight 86.1 kg (189 lb 13.1 oz), SpO2 96 %.  Intake/Output last 3 Shifts:  I/O last 3 completed shifts:  In: 4186.7 (48.6 mL/kg) [P.O.:960; IV Piggyback:3226.7]  Out: - (0 mL/kg)   Weight: 86.1 kg     Relevant Results                No results found for this or any previous visit (from the past 24 hour(s)).  CT abdomen pelvis w IV contrast    Result Date: 12/15/2023  Interpreted By:  Herb Trevino, STUDY: CT ABDOMEN PELVIS W IV CONTRAST; 12/15/2023 2:05 pm   INDICATION: Signs/Symptoms:History of Crohn's, increasing abdominal pain since flareup 4 days ago when he had CT scan.   COMPARISON: December 12, 2023.   ACCESSION NUMBER(S): [id number]   ORDERING CLINICIAN: TATIANA WU   TECHNIQUE: Oral contrast was not administered. 75 ml Omnipaque 350 was injected intravenously. CT of the Abdomen and Pelvis with intravenous contrast " was performed. Axial, sagittal and coronal reformatted images were reviewed.   All CT examinations are performed with 1 or more of the following dose reduction techniques: Automated exposure control, adjustment of mA and/or kv according to patient's size, or use of iterative reconstruction techniques.   FINDINGS: Lower Chest: Unremarkable.   Abdomen: Liver: Unremarkable. Bile Ducts: Normal caliber. Gallbladder: Unremarkable. Pancreas: Unremarkable. Spleen: Unremarkable. Adrenals: Normal. Kidneys: Normal.   Pelvis: No pelvic masses. Appendix: Normal. Bladder: Unremarkable.   Bowel: No bowel wall thickening or abnormal distention to suggest bowel obstruction.. Mesenteric Lymph Nodes: Multiple prominent mesenteric lymph nodes again noted in the right lower abdomen near the terminal ileum. Peritoneum: There is no free fluid or free intraperitoneal gas. There are no localized fluid collections. Vessels: Unremarkable Retroperitoneum: No retroperitoneal adenopathy. Abdominal Wall: Unremarkable. Bones: No acute bony abnormalities.       Multiple prominent mesenteric lymph nodes again noted in the right lower abdomen near the terminal ileum. Findings are nonspecific and can be seen with infectious or inflammatory etiologies.   MACRO: none   Signed by: Herb Trevino 12/15/2023 2:32 PM Dictation workstation:   [id number]    CT abdomen pelvis w IV contrast    Result Date: 12/12/2023  Interpreted By:  Samir Lyons, STUDY: CT ABDOMEN PELVIS W IV CONTRAST;  12/12/2023 1:50 pm   INDICATION: Signs/Symptoms:LUQ pain.   COMPARISON: None.   ACCESSION NUMBER(S): [id number]   ORDERING CLINICIAN: JOAQUIN HIGH   TECHNIQUE: CT of the abdomen and pelvis was performed.  Standard contiguous axial images were obtained at 3 mm slice thickness through the abdomen and pelvis. Coronal and sagittal reconstructions at 3 mm slice thickness were performed.   75 mL of Omnipaque were administered intravenously without immediate complication.    FINDINGS: LOWER CHEST: No focal airspace consolidations or effusions   ABDOMEN:   LIVER: Normal size. No focal lesions.   BILE DUCTS: Nondilated.   GALLBLADDER: No hyperdense stones. No wall thickening.   PANCREAS: No focal lesions.   SPLEEN: Normal size. No focal lesions.   ADRENAL GLANDS: Within normal limits.   KIDNEYS AND URETERS: No stones. No hydronephrosis.   PELVIS:   BLADDER: Within normal limits.   REPRODUCTIVE ORGANS: No suspicious findings.   BOWEL: The stomach, small and large bowel are unremarkable.   VESSELS: IVC and aorta are normal.   PERITONEUM/RETROPERITONEUM/LYMPH NODES: There are multiple prominent, subcentimeter lymph nodes involving the right hemiabdomen. Example is measured on series 3, image 76, measuring 1.3 x 1.0 cm.   BONES AND ABDOMINAL WALL: No acute findings.       There are multiple prominent, subcentimeter lymph nodes involving the right hemiabdomen. This is nonspecific, and may represent an infectious or inflammatory etiology.   MACRO: None   Signed by: Samir Lyons 12/12/2023 3:03 PM Dictation workstation:   [id number]    XR chest 2 views    Result Date: 12/12/2023  Interpreted By:  Samir Lyons, STUDY: XR CHEST 2 VIEWS;  12/12/2023 2:02 pm   INDICATION: Signs/Symptoms:L rib pain.   COMPARISON: 01/30/2017.   ACCESSION NUMBER(S): [id number]   ORDERING CLINICIAN: JOAQUIN HIGH   FINDINGS:         CARDIOMEDIASTINAL SILHOUETTE: Cardiomediastinal silhouette is normal in size and configuration.   LUNGS: Lungs are clear.   ABDOMEN: No remarkable upper abdominal findings.   BONES: No acute osseous changes.       No evidence of acute cardiopulmonary process.     MACRO: None   Signed by: Samir Lyons 12/12/2023 2:59 PM Dictation workstation:   [id number]                Assessment/Plan   Principal Problem:    Exacerbation of Crohn's disease without complication (CMS/HCC)    Diarrhea, Abdominal Pain, Abnormal CT   -Reviewed prior GI records per Dr Huizar. Colonoscopy Feb 2023 without TI  inflammation or any obvious colitis at that time. Patient reports being diagnosed with Crohns but no meds started   -Negative infectious stool studies  -Fecal Calprotectin pending   -No obstruction or obvious wall thickening on CT. No indication for steroids   -Diet as tolerated  -Imodium as needed     Outpatient follow up with Dr Huizar. Imodium as needed as stool studies were neg     No barriers to DC from GI standpoint         I spent 20 minutes in the professional and overall care of this patient.      Lin Mcwilliams, APRN-CNP

## 2023-12-17 NOTE — PROGRESS NOTES
"Prem Mirza is a 33 y.o. male on day 2 of admission presenting with Right sided abdominal pain.    Subjective   Abdominal pain and diarrhea doing better       Objective     Physical Exam  Vitals reviewed.   Constitutional:       Appearance: Normal appearance.   HENT:      Head: Normocephalic and atraumatic.      Right Ear: Tympanic membrane, ear canal and external ear normal.      Left Ear: Tympanic membrane, ear canal and external ear normal.      Nose: Nose normal.      Mouth/Throat:      Pharynx: Oropharynx is clear.   Eyes:      Extraocular Movements: Extraocular movements intact.      Conjunctiva/sclera: Conjunctivae normal.      Pupils: Pupils are equal, round, and reactive to light.   Cardiovascular:      Rate and Rhythm: Normal rate and regular rhythm.      Pulses: Normal pulses.      Heart sounds: Normal heart sounds.   Pulmonary:      Effort: Pulmonary effort is normal.      Breath sounds: Normal breath sounds.   Abdominal:      General: Abdomen is flat. Bowel sounds are normal.      Palpations: Abdomen is soft.   Musculoskeletal:      Cervical back: Normal range of motion and neck supple.   Skin:     General: Skin is warm and dry.   Neurological:      General: No focal deficit present.      Mental Status: He is alert and oriented to person, place, and time.   Psychiatric:         Mood and Affect: Mood normal.         Last Recorded Vitals  Blood pressure 149/76, pulse 99, temperature 36.9 °C (98.4 °F), temperature source Oral, resp. rate 18, height 1.753 m (5' 9\"), weight 86.1 kg (189 lb 13.1 oz), SpO2 97 %.  Intake/Output last 3 Shifts:  I/O last 3 completed shifts:  In: 4186.7 (48.6 mL/kg) [P.O.:960; IV Piggyback:3226.7]  Out: - (0 mL/kg)   Weight: 86.1 kg     Relevant Results                             Assessment/Plan   Principal Problem:    Right sided abdominal pain    Per GI no definite diagnosis of Crohn's disease at this time  Negative for infectious etiology  Plan to follow-up with GI " outpatient for further testing  [facility]       I spent  minutes in the professional and overall care of this patient.      Britni Kapoor MD

## 2023-12-18 ENCOUNTER — PATIENT OUTREACH (OUTPATIENT)
Dept: CARE COORDINATION | Facility: CLINIC | Age: 33
End: 2023-12-18
Payer: COMMERCIAL

## 2023-12-18 DIAGNOSIS — K52.9 COLITIS: ICD-10-CM

## 2023-12-18 NOTE — PROGRESS NOTES
"Outreach call to patient to support a smooth transition of care from recent admission.  Spoke with patient, reviewed discharge medications, discharge instructions, assessed social needs, and provided education on importance of follow-up appointment with provider.  Will continue to monitor through transition period.    Patient states, he is feeling the same as when he went to the hospital. Patient states, he continues to \"have a lot, a lot of abdominal pain\" and he was discharged on Tylenol for pain control. Patient states, he needs something a little stronger than Tylenol to help with pain control. Patient states, he spoke with his family doctor and a stat referral was placed for the Gastroenterologist.    Engagement  Call Start Time: 1515 (12/18/2023  3:15 PM)    Medications  Medications reviewed with patient/caregiver?: Yes (12/18/2023  3:15 PM)  Is the patient having any side effects they believe may be caused by any medication additions or changes?: No (12/18/2023  3:15 PM)  Does the patient have all medications ordered at discharge?: Yes (12/18/2023  3:15 PM)  Care Management Interventions: No intervention needed (12/18/2023  3:15 PM)  Prescription Comments: Patient was discharged on Tylenol for pain control (12/18/2023  3:15 PM)  Is the patient taking all medications as directed (includes completed medication regime)?: Yes (12/18/2023  3:15 PM)    Appointments  Does the patient have a primary care provider?: Yes (Patient states, he spoke with the doctor and a referral for Gastrenteroloist was placed.) (12/18/2023  3:15 PM)  Care Management Interventions: Advised to schedule with specialist (12/18/2023  3:15 PM)    Self Management  What is the home health agency?: not applicable (12/18/2023  3:15 PM)  What Durable Medical Equipment (DME) was ordered?: not applicable (12/18/2023  3:15 PM)    Patient Teaching  Does the patient have access to their discharge instructions?: Yes (12/18/2023  3:15 PM)  Care " Management Interventions: Reviewed instructions with patient (12/18/2023  3:15 PM)  What is the patient's perception of their health status since discharge?: Same (12/18/2023  3:15 PM)  Is the patient/caregiver able to teach back the hierarchy of who to call/visit for symptoms/problems? PCP, Specialist, Home Health nurse, Urgent Care, ED, 911: Yes (12/18/2023  3:15 PM)    Vero Joiner RN

## 2023-12-19 LAB — CALPROTECTIN STL-MCNT: 822 UG/G

## 2024-01-05 ENCOUNTER — PATIENT OUTREACH (OUTPATIENT)
Dept: CARE COORDINATION | Facility: CLINIC | Age: 34
End: 2024-01-05
Payer: COMMERCIAL

## 2024-01-05 NOTE — PROGRESS NOTES
Outreach call to patient to check in after hospital discharge to support smooth transition of care.  Patient states, he is doing better now. Tolerating a bland diet. Patient with no additional needs noted. CM will continue to follow.  Vero Joiner RN

## 2024-01-25 ENCOUNTER — PATIENT OUTREACH (OUTPATIENT)
Dept: CARE COORDINATION | Facility: CLINIC | Age: 34
End: 2024-01-25
Payer: COMMERCIAL

## 2024-01-25 NOTE — PROGRESS NOTES
Outreach call to patient to check in 30 days after hospital discharge to support smooth transition of care.  Mailbox is full and unable to leave a message. No additional outreach needed at this time.     Vero Joiner RN/CM  [facility]  [phone]

## 2025-01-23 ENCOUNTER — LAB (OUTPATIENT)
Dept: LAB | Facility: LAB | Age: 35
End: 2025-01-23

## 2025-01-23 ENCOUNTER — OFFICE VISIT (OUTPATIENT)
Dept: PRIMARY CARE | Facility: CLINIC | Age: 35
End: 2025-01-23

## 2025-01-23 VITALS
HEIGHT: 69 IN | BODY MASS INDEX: 27.4 KG/M2 | DIASTOLIC BLOOD PRESSURE: 74 MMHG | WEIGHT: 185 LBS | SYSTOLIC BLOOD PRESSURE: 126 MMHG

## 2025-01-23 DIAGNOSIS — H66.90 EAR INFECTION: ICD-10-CM

## 2025-01-23 DIAGNOSIS — J32.9 RHINOSINUSITIS: Primary | ICD-10-CM

## 2025-01-23 DIAGNOSIS — J02.9 PHARYNGITIS, UNSPECIFIED ETIOLOGY: ICD-10-CM

## 2025-01-23 DIAGNOSIS — Z20.2 STD EXPOSURE: ICD-10-CM

## 2025-01-23 DIAGNOSIS — J32.9 SINUSITIS, UNSPECIFIED CHRONICITY, UNSPECIFIED LOCATION: ICD-10-CM

## 2025-01-23 DIAGNOSIS — J40 BRONCHITIS: ICD-10-CM

## 2025-01-23 LAB — HIV 1+2 AB+HIV1 P24 AG SERPL QL IA: NONREACTIVE

## 2025-01-23 PROCEDURE — 86695 HERPES SIMPLEX TYPE 1 TEST: CPT

## 2025-01-23 PROCEDURE — 87491 CHLMYD TRACH DNA AMP PROBE: CPT

## 2025-01-23 PROCEDURE — 87389 HIV-1 AG W/HIV-1&-2 AB AG IA: CPT

## 2025-01-23 PROCEDURE — 99213 OFFICE O/P EST LOW 20 MIN: CPT | Performed by: INTERNAL MEDICINE

## 2025-01-23 PROCEDURE — 80074 ACUTE HEPATITIS PANEL: CPT

## 2025-01-23 PROCEDURE — 87591 N.GONORRHOEAE DNA AMP PROB: CPT

## 2025-01-23 PROCEDURE — 3008F BODY MASS INDEX DOCD: CPT | Performed by: INTERNAL MEDICINE

## 2025-01-23 PROCEDURE — 86696 HERPES SIMPLEX TYPE 2 TEST: CPT

## 2025-01-23 RX ORDER — LEVOFLOXACIN 500 MG/1
500 TABLET, FILM COATED ORAL DAILY
Qty: 10 TABLET | Refills: 0 | Status: SHIPPED | OUTPATIENT
Start: 2025-01-23 | End: 2025-02-02

## 2025-01-23 RX ORDER — OFLOXACIN 3 MG/ML
5 SOLUTION AURICULAR (OTIC) 2 TIMES DAILY
Qty: 10 ML | Refills: 0 | Status: SHIPPED | OUTPATIENT
Start: 2025-01-23

## 2025-01-23 RX ORDER — CIPROFLOXACIN 500 MG/1
500 TABLET ORAL 2 TIMES DAILY
Qty: 20 TABLET | Refills: 0 | Status: CANCELLED | OUTPATIENT
Start: 2025-01-23 | End: 2025-02-02

## 2025-01-23 ASSESSMENT — ENCOUNTER SYMPTOMS
LOSS OF SENSATION IN FEET: 0
OCCASIONAL FEELINGS OF UNSTEADINESS: 0
DEPRESSION: 0

## 2025-01-23 NOTE — LETTER
January 23, 2025     Patient: Prem Mirza   YOB: 1990   Date of Visit: 1/23/2025       To Whom It May Concern:    Prem Mirza was seen in my clinic on 1/23/2025 at 12:00 pm. Please excuse Prem for his absence from work on this day to make the appointment.    If you have any questions or concerns, please don't hesitate to call.         Sincerely,         Wilver Kapoor MD

## 2025-01-24 LAB
C TRACH RRNA SPEC QL NAA+PROBE: NEGATIVE
HAV IGM SER QL: NONREACTIVE
HBV CORE IGM SER QL: NONREACTIVE
HBV SURFACE AG SERPL QL IA: NONREACTIVE
HCV AB SER QL: NONREACTIVE
HERPES SIMPLEX VIRUS 1 IGG: <0.2 INDEX
HERPES SIMPLEX VIRUS 2 IGG: >8 INDEX
N GONORRHOEA DNA SPEC QL PROBE+SIG AMP: NEGATIVE

## 2025-01-24 NOTE — PROGRESS NOTES
"Subjective   Patient ID: Prem Mirza is a 34 y.o. male who presents for Illness.    Mr. Prem Mirza today came here for cough, yellow sputum, sinus congestion, bronchitis going on for last several days.  He could not go to work, mild fever.  He has Crohn's disease.  He is under treatment, but at the moment he is not taking anything.    I have personally reviewed the patient's Past Medical History, Medications, Allergies, Social History, and Family History in the EMR.    Review of Systems   All other systems reviewed and are negative.    Objective   /74   Ht 1.753 m (5' 9\")   Wt 83.9 kg (185 lb)   BMI 27.32 kg/m²     Physical Exam  Vitals reviewed.   HENT:      Head:      Comments: Throat congested.     Nose: Congestion present.      Comments: Postnasal drip.  Cardiovascular:      Heart sounds: Normal heart sounds, S1 normal and S2 normal. No murmur heard.     No friction rub.   Pulmonary:      Effort: Pulmonary effort is normal.      Breath sounds: Normal air entry. Wheezing present.   Abdominal:      Palpations: There is no hepatomegaly, splenomegaly or mass.   Musculoskeletal:      Right lower leg: No edema.      Left lower leg: No edema.   Lymphadenopathy:      Lower Body: No right inguinal adenopathy. No left inguinal adenopathy.   Neurological:      Cranial Nerves: Cranial nerves 2-12 are intact.      Sensory: No sensory deficit.      Motor: Motor function is intact.      Deep Tendon Reflexes: Reflexes are normal and symmetric.     LAB WORK: Laboratory testing discussed.    Assessment/Plan   Problem List Items Addressed This Visit    None  Visit Diagnoses         Codes    Rhinosinusitis    -  Primary J32.9    Sinusitis, unspecified chronicity, unspecified location     J32.9    Relevant Medications    levoFLOXacin (Levaquin) 500 mg tablet    Ear infection     H66.90    Relevant Medications    ofloxacin (Floxin) 0.3 % otic solution    levoFLOXacin (Levaquin) 500 mg tablet    STD exposure     " Z20.2    Relevant Orders    HIV 1/2 Antigen/Antibody Screen with Reflex to Confirmation (Completed)    Hepatitis Panel, Acute    C. trachomatis / N. gonorrhoeae, Amplified    HSV1 IgG and HSV2 IgG    Pharyngitis, unspecified etiology     J02.9    Bronchitis     J40        1.  Rhinosinusitis, pharyngitis, bronchitis.  Levaquin, Claritin, Robitussin, Flonase.  Supportive care.  2. Follow up in two weeks if not better.  3. Continue to follow.    Shiraz Attestation  By signing my name below, Naina ESTRADA Scribe attest that this documentation has been prepared under the direction and in the presence of Wilver Kapoor MD.

## 2025-01-25 ENCOUNTER — APPOINTMENT (OUTPATIENT)
Dept: PRIMARY CARE | Facility: CLINIC | Age: 35
End: 2025-01-25

## 2025-03-19 ENCOUNTER — TELEMEDICINE (OUTPATIENT)
Dept: PRIMARY CARE | Facility: CLINIC | Age: 35
End: 2025-03-19
Payer: COMMERCIAL

## 2025-03-19 DIAGNOSIS — E86.0 DEHYDRATION: Primary | ICD-10-CM

## 2025-03-19 DIAGNOSIS — R11.2 NAUSEA AND VOMITING, UNSPECIFIED VOMITING TYPE: ICD-10-CM

## 2025-03-19 PROCEDURE — 99213 OFFICE O/P EST LOW 20 MIN: CPT | Performed by: INTERNAL MEDICINE

## 2025-03-19 RX ORDER — ONDANSETRON 4 MG/1
4 TABLET, ORALLY DISINTEGRATING ORAL EVERY 8 HOURS PRN
Qty: 20 TABLET | Refills: 0 | Status: SHIPPED | OUTPATIENT
Start: 2025-03-19 | End: 2025-03-26

## 2025-03-19 NOTE — LETTER
March 19, 2025     Patient: Prem Mirza   YOB: 1990   Date of Visit: 3/19/2025       To Whom It May Concern:    Prem Mirza was seen in my clinic on 3/19/2025 at 11:30 am. Please excuse Prem for his absence from work on this day to make the appointment.    If you have any questions or concerns, please don't hesitate to call.         Sincerely,         Wilver Kapoor MD

## 2025-03-20 NOTE — PROGRESS NOTES
Subjective   Patient ID: Prem Mirza is a 34 y.o. male who presents for Virtual Visit.    Mr. Prem Mirza today came here for virtual visit.  He is not feeling good.  His Crohn's flared up.  A lot of diarrhea, bloody stools.  Very weak, tired, fatigued, and exhausted.  He could not go to work. He is trying to get hold off his Endocrine.  He has GI.  He came here for follow-up on various conditions.    I have personally reviewed the patient's Past Medical History, Medications, Allergies, Social History, and Family History in the EMR.    Review of Systems   All other systems reviewed and are negative.    Objective   Virtual exam.  There were no vitals taken for this visit.    Physical Exam  Constitutional:       Comments: The patient looks dehydrated.    Musculoskeletal:      Right lower leg: No edema.      Left lower leg: No edema.   Skin:     Comments: Skin turgor low on camera.     LAB WORK:  Laboratory testing discussed.    Assessment/Plan   Problem List Items Addressed This Visit             ICD-10-CM    Vomiting R11.10    Relevant Medications    ondansetron ODT (Zofran-ODT) 4 mg disintegrating tablet     Other Visit Diagnoses         Codes    Dehydration    -  Primary E86.0        1. Acute exacerbation of Crohn’s disease.  Supportive care.  He will see Dr. Huizar.  I will help him with that.  2. Dehydration.  Monitor.  3. Work note given.  4. Prednisone called in.  5. Continue to follow.  6. Virtual visit 22 minutes, more than half in direct patient care.    Scribe Attestation  By signing my name below, INaina Scribe attest that this documentation has been prepared under the direction and in the presence of Wilver Kapoor MD.     All medical record entries made by the scribe were personally dictated by me I have reviewed the chart and agree the record accurately reflects my personal performance of his history physical examination and management

## 2025-05-06 ENCOUNTER — TELEMEDICINE (OUTPATIENT)
Dept: PRIMARY CARE | Facility: CLINIC | Age: 35
End: 2025-05-06
Payer: COMMERCIAL

## 2025-05-06 DIAGNOSIS — K52.9 COLITIS: ICD-10-CM

## 2025-05-06 DIAGNOSIS — K50.919 CROHN'S DISEASE WITH COMPLICATION, UNSPECIFIED GASTROINTESTINAL TRACT LOCATION: ICD-10-CM

## 2025-05-06 DIAGNOSIS — R11.2 NAUSEA AND VOMITING, UNSPECIFIED VOMITING TYPE: ICD-10-CM

## 2025-05-06 DIAGNOSIS — E86.0 DEHYDRATION: ICD-10-CM

## 2025-05-06 DIAGNOSIS — R10.9 ABDOMINAL PAIN, UNSPECIFIED ABDOMINAL LOCATION: Primary | ICD-10-CM

## 2025-05-06 PROCEDURE — 99213 OFFICE O/P EST LOW 20 MIN: CPT | Performed by: INTERNAL MEDICINE

## 2025-05-06 RX ORDER — DICYCLOMINE HYDROCHLORIDE 10 MG/1
10 CAPSULE ORAL 3 TIMES DAILY
Qty: 270 CAPSULE | Refills: 0 | Status: SHIPPED | OUTPATIENT
Start: 2025-05-06

## 2025-05-06 RX ORDER — ONDANSETRON 4 MG/1
4 TABLET, ORALLY DISINTEGRATING ORAL EVERY 8 HOURS PRN
Qty: 20 TABLET | Refills: 0 | Status: SHIPPED | OUTPATIENT
Start: 2025-05-06 | End: 2025-05-13

## 2025-05-06 NOTE — PROGRESS NOTES
Subjective   Patient ID: Prem Mirza is a 34 y.o. male who presents for Abdominal Pain, Nausea, and Vomiting.    Mr. Yuki Amaro today came here for abdominal pain, nausea, vomiting, not feeling good, dehydrated.  He thinks he has viral infection.  He is feeling weak, tired, fatigued, and exhausted.  He came here for follow-up.    I have personally reviewed the patient's Past Medical History, Medications, Allergies, Social History, and Family History in the EMR.    Review of Systems   All other systems reviewed and are negative.    Objective   Virtual exam. There were no vitals taken for this visit.    Physical Exam  HENT:      Mouth/Throat:      Comments: Mucous membranes dry, looks dehydrated and weak.    LAB WORK:  Laboratory testing discussed.    Assessment/Plan   Problem List Items Addressed This Visit           ICD-10-CM    Pain in the abdomen - Primary R10.9    Vomiting R11.10    Relevant Medications    ondansetron ODT (Zofran-ODT) 4 mg disintegrating tablet    Colitis K52.9    Relevant Medications    dicyclomine (Bentyl) 10 mg capsule     Other Visit Diagnoses         Codes      Crohn's disease with complication, unspecified gastrointestinal tract location     K50.919      Dehydration     E86.0        1. Abdominal pain, nausea, vomiting, could be food positioning versus viral disease, symptomatically, Zofran and Prilosec given.  2. Crohn’s disease.  At the moment under controlled.  No diarrhea.  Bentyl given for spasmodic pain.  3. The patient is very dehydrated.  Oral hydration.  4. Total time spent more than 24 minutes, more than half in direct patient care.    Scribe Attestation  By signing my name below, I, Shiraz Conley attest that this documentation has been prepared under the direction and in the presence of Wilver Kapoor MD.     All medical record entries made by the scribmariaelena were personally dictated by me I have reviewed the chart and agree the record accurately reflects my personal  performance of his history physical examination and management

## 2025-05-06 NOTE — LETTER
May 6, 2025     Patient: Prem Mirza   YOB: 1990   Date of Visit: 5/6/2025       To Whom It May Concern:    Prem Mirza was seen in my clinic on 5/6/2025 at 12:00 pm. Please excuse Prem for his absence from work on 05/06/2025. Prem may return on 05/07/2025.    If you have any questions or concerns, please don't hesitate to call.         Sincerely,         Wilver Kapoor MD

## 2025-06-02 ENCOUNTER — TELEMEDICINE (OUTPATIENT)
Dept: PRIMARY CARE | Facility: CLINIC | Age: 35
End: 2025-06-02
Payer: COMMERCIAL

## 2025-06-02 DIAGNOSIS — R11.2 NAUSEA AND VOMITING, UNSPECIFIED VOMITING TYPE: ICD-10-CM

## 2025-06-02 DIAGNOSIS — E86.0 DEHYDRATION: ICD-10-CM

## 2025-06-02 DIAGNOSIS — R50.9 FEVER AND CHILLS: Primary | ICD-10-CM

## 2025-06-02 PROCEDURE — 99213 OFFICE O/P EST LOW 20 MIN: CPT | Performed by: INTERNAL MEDICINE

## 2025-06-02 RX ORDER — ONDANSETRON 4 MG/1
4 TABLET, ORALLY DISINTEGRATING ORAL EVERY 8 HOURS PRN
Qty: 20 TABLET | Refills: 0 | Status: SHIPPED | OUTPATIENT
Start: 2025-06-02 | End: 2025-06-09

## 2025-06-02 RX ORDER — OSELTAMIVIR PHOSPHATE 75 MG/1
75 CAPSULE ORAL 2 TIMES DAILY
Qty: 10 CAPSULE | Refills: 0 | Status: SHIPPED | OUTPATIENT
Start: 2025-06-02 | End: 2025-06-07

## 2025-06-02 NOTE — LETTER
June 2, 2025     Patient: Prem Mirza   YOB: 1990   Date of Visit: 6/2/2025       To Whom It May Concern:    Prem Mirza was seen in my clinic on 6/2/2025 at 11:00 am. Please excuse Prem for his absence from work this morning, may return in afternoon on 06/02/2025.     If you have any questions or concerns, please don't hesitate to call.         Sincerely,         Wilver Kapoor MD

## 2025-06-02 NOTE — PROGRESS NOTES
Subjective   Patient ID: Prem Mirza is a 34 y.o. male who presents for Nausea, Vomiting, and Fever.    Mr. Prem Mirza today came here for virtual visit.  He has fever, chills, nausea, vomiting.  He woke up at 4 o'clock in the morning.  Mild fever.  Loss of appetite and weakness.  Feeling lethargic and body ache.  He is not feeling good.    I have personally reviewed the patient's Past Medical History, Medications, Allergies, Social History, and Family History in the EMR.    Review of Systems   All other systems reviewed and are negative.    Objective   Virtual visit. There were no vitals taken for this visit.    Physical Exam  Constitutional:       Comments: On camera, the patient looked dehydrated, very weak.   Musculoskeletal:      Right lower leg: No edema.      Left lower leg: No edema.     LAB WORK:  Laboratory testing discussed.    Assessment/Plan   Problem List Items Addressed This Visit           ICD-10-CM    Vomiting R11.10    Relevant Medications    ondansetron ODT (Zofran-ODT) 4 mg disintegrating tablet    oseltamivir (Tamiflu) 75 mg capsule     Other Visit Diagnoses         Codes      Fever and chills    -  Primary R50.9      Dehydration     E86.0        1. Fever, chills, nauseated, not feeling good.  Clinically looks like a flu.  Tamiflu started.  Three days off work.  2. Dehydration.  Oral hydration explained.  3. Nausea.  Zofran given.  Supportive care.  4. Follow up in three to four days if not better.    Reibe Attestation  By signing my name below, INaina Scribe attest that this documentation has been prepared under the direction and in the presence of Wilver Kapoor MD.     All medical record entries made by the reibmariaelena were personally dictated by me I have reviewed the chart and agree the record accurately reflects my personal performance of his history physical examination and management

## 2025-06-11 ENCOUNTER — OFFICE VISIT (OUTPATIENT)
Dept: PRIMARY CARE | Facility: CLINIC | Age: 35
End: 2025-06-11
Payer: COMMERCIAL

## 2025-06-11 ENCOUNTER — LAB REQUISITION (OUTPATIENT)
Dept: LAB | Facility: HOSPITAL | Age: 35
End: 2025-06-11

## 2025-06-11 ENCOUNTER — HOSPITAL ENCOUNTER (OUTPATIENT)
Dept: RADIOLOGY | Facility: HOSPITAL | Age: 35
Discharge: HOME | End: 2025-06-11
Payer: COMMERCIAL

## 2025-06-11 VITALS
BODY MASS INDEX: 25.92 KG/M2 | WEIGHT: 175 LBS | HEIGHT: 69 IN | DIASTOLIC BLOOD PRESSURE: 72 MMHG | SYSTOLIC BLOOD PRESSURE: 126 MMHG

## 2025-06-11 DIAGNOSIS — R10.9 UNSPECIFIED ABDOMINAL PAIN: ICD-10-CM

## 2025-06-11 DIAGNOSIS — R10.9 ABDOMINAL PAIN, UNSPECIFIED ABDOMINAL LOCATION: ICD-10-CM

## 2025-06-11 LAB
AMYLASE SERPL-CCNC: 59 U/L (ref 29–103)
LIPASE SERPL-CCNC: 76 U/L (ref 9–82)

## 2025-06-11 PROCEDURE — 82150 ASSAY OF AMYLASE: CPT

## 2025-06-11 PROCEDURE — 99214 OFFICE O/P EST MOD 30 MIN: CPT | Performed by: INTERNAL MEDICINE

## 2025-06-11 PROCEDURE — 74177 CT ABD & PELVIS W/CONTRAST: CPT

## 2025-06-11 PROCEDURE — 3008F BODY MASS INDEX DOCD: CPT | Performed by: INTERNAL MEDICINE

## 2025-06-11 PROCEDURE — 2550000001 HC RX 255 CONTRASTS: Performed by: INTERNAL MEDICINE

## 2025-06-11 PROCEDURE — 83690 ASSAY OF LIPASE: CPT

## 2025-06-11 RX ADMIN — IOHEXOL 75 ML: 350 INJECTION, SOLUTION INTRAVENOUS at 17:35

## 2025-06-11 NOTE — PROGRESS NOTES
"Subjective   Patient ID: Prem Mirza is a 34 y.o. male who presents for Follow-up (Abdominal pain).    Mr. Mirza today came here for upper abdominal pain, nausea, dyspepsia, right upper quadrant ______ hurting.  He is taking Zofran.  It is helping, but still nauseated, cannot keep the food down.  Very weak, tired, fatigued, exhausted.  No energy.  Going on for last few days.  He had done virtual visit, did not help.    I have personally reviewed the patient's Past Medical History, Medications, Allergies, Social History, and Family History in the EMR.    Review of Systems   All other systems reviewed and are negative.    Objective   /72   Ht 1.753 m (5' 9\")   Wt 79.4 kg (175 lb)   BMI 25.84 kg/m²     Physical Exam  Vitals reviewed.   Cardiovascular:      Heart sounds: Normal heart sounds, S1 normal and S2 normal. No murmur heard.     No friction rub.   Pulmonary:      Effort: Pulmonary effort is normal.      Breath sounds: Normal breath sounds and air entry.   Abdominal:      Palpations: There is no hepatomegaly, splenomegaly or mass.      Comments: Right upper quadrant pain, very tender.  Rebound and guarding present.   Musculoskeletal:      Right lower leg: No edema.      Left lower leg: No edema.   Lymphadenopathy:      Lower Body: No right inguinal adenopathy. No left inguinal adenopathy.   Neurological:      Cranial Nerves: Cranial nerves 2-12 are intact.      Sensory: No sensory deficit.      Motor: Motor function is intact.      Deep Tendon Reflexes: Reflexes are normal and symmetric.     LAB WORK: Laboratory testing discussed.    Assessment/Plan   Problem List Items Addressed This Visit           ICD-10-CM    Pain in the abdomen R10.9    Relevant Orders    Amylase    Lipase    CT abdomen pelvis w IV contrast (Completed)    Hepatitis Panel, Acute   1. Abdominal pain, nausea.  Immediate CT scan ordered.  Blood work ordered.  I advised him to get admitted, but he is not keen.  Oral hydration " explained.  2. I shall see him immediately after CT scan.    Shiraz Attestation  By signing my name below, I, Shiraz Conley attest that this documentation has been prepared under the direction and in the presence of Wilver Kapoor MD.     All medical record entries made by the jaimeibmariaelena were personally dictated by me I have reviewed the chart and agree the record accurately reflects my personal performance of his history physical examination and management

## 2025-06-11 NOTE — LETTER
June 11, 2025     Patient: Prem Mirza   YOB: 1990   Date of Visit: 6/11/2025       To Whom It May Concern:    Prem Mirza was seen in my clinic on 6/11/2025 at 3:30 pm. Please excuse Prem for his absence from work on this day to make the appointment.    If you have any questions or concerns, please don't hesitate to call.         Sincerely,         Wilver Kapoor MD

## 2025-06-12 ENCOUNTER — OFFICE VISIT (OUTPATIENT)
Dept: PRIMARY CARE | Facility: CLINIC | Age: 35
End: 2025-06-12
Payer: COMMERCIAL

## 2025-06-12 VITALS
HEIGHT: 69 IN | SYSTOLIC BLOOD PRESSURE: 126 MMHG | BODY MASS INDEX: 25.92 KG/M2 | WEIGHT: 175 LBS | DIASTOLIC BLOOD PRESSURE: 74 MMHG

## 2025-06-12 DIAGNOSIS — R10.9 ABDOMINAL PAIN, UNSPECIFIED ABDOMINAL LOCATION: Primary | ICD-10-CM

## 2025-06-12 DIAGNOSIS — K21.9 GASTROESOPHAGEAL REFLUX DISEASE WITHOUT ESOPHAGITIS: ICD-10-CM

## 2025-06-12 DIAGNOSIS — K52.9 COLITIS: ICD-10-CM

## 2025-06-12 DIAGNOSIS — E86.0 DEHYDRATION: ICD-10-CM

## 2025-06-12 LAB
HAV IGM SERPL QL IA: NORMAL
HBV CORE IGM SERPL QL IA: NORMAL
HBV SURFACE AG SERPL QL IA: NORMAL
HCV AB SERPL QL IA: NORMAL

## 2025-06-12 PROCEDURE — 99213 OFFICE O/P EST LOW 20 MIN: CPT | Performed by: INTERNAL MEDICINE

## 2025-06-12 PROCEDURE — 3008F BODY MASS INDEX DOCD: CPT | Performed by: INTERNAL MEDICINE

## 2025-06-12 RX ORDER — OMEPRAZOLE 40 MG/1
40 CAPSULE, DELAYED RELEASE ORAL
Qty: 30 CAPSULE | Refills: 0 | Status: SHIPPED | OUTPATIENT
Start: 2025-06-12

## 2025-06-12 RX ORDER — DICYCLOMINE HYDROCHLORIDE 10 MG/1
10 CAPSULE ORAL 3 TIMES DAILY
Qty: 270 CAPSULE | Refills: 0 | Status: SHIPPED | OUTPATIENT
Start: 2025-06-12

## 2025-06-12 ASSESSMENT — ENCOUNTER SYMPTOMS: ABDOMINAL PAIN: 1

## 2025-06-12 NOTE — Clinical Note
June 12, 2025     Patient: Prem Mirza   YOB: 1990   Date of Visit: 6/12/2025       To Whom It May Concern:    Prem Mirza was seen in my clinic on 6/12/2025 at 6:15 pm. Please excuse Prem for his absence from work on this day to make the appointment.    If you have any questions or concerns, please don't hesitate to call.         Sincerely,         Wilver Kapoor MD        CC: No Recipients

## 2025-06-13 ASSESSMENT — ENCOUNTER SYMPTOMS: ABDOMINAL PAIN: 1

## 2025-06-13 NOTE — PROGRESS NOTES
"Subjective   Patient ID: Prem Mirza is a 34 y.o. male who presents for Follow-up (Abdominal pain).    This gentleman today came here for abdominal pain, cramping, not feeling good.  He had a CT scan done today.  He came for follow-up.  No blood in stool.  Weak, tired, fatigued, exhausted.  He had a history of inflammatory bowel disease flareup in the past.  He is concerned.  currently, he is not seeing any GI.    I have personally reviewed the patient's Past Medical History, Medications, Allergies, Social History, and Family History in the EMR.    Abdominal Pain  This is a recurrent problem. The current episode started 1 to 4 weeks ago. The onset quality is undetermined. The problem occurs constantly. The most recent episode lasted 10 days.     Review of Systems   Gastrointestinal:  Positive for abdominal pain.   All other systems reviewed and are negative.    Objective   /74   Ht 1.753 m (5' 9\")   Wt 79.4 kg (175 lb)   BMI 25.84 kg/m²     Physical Exam  Vitals reviewed.   Cardiovascular:      Heart sounds: Normal heart sounds, S1 normal and S2 normal. No murmur heard.     No friction rub.   Pulmonary:      Effort: Pulmonary effort is normal.      Breath sounds: Normal breath sounds and air entry.   Abdominal:      Palpations: There is no hepatomegaly, splenomegaly or mass.      Tenderness: There is abdominal tenderness (diffuse).   Musculoskeletal:      Right lower leg: No edema.      Left lower leg: No edema.   Lymphadenopathy:      Lower Body: No right inguinal adenopathy. No left inguinal adenopathy.   Neurological:      Cranial Nerves: Cranial nerves 2-12 are intact.      Sensory: No sensory deficit.      Motor: Motor function is intact.      Deep Tendon Reflexes: Reflexes are normal and symmetric.     LAB WORK: Laboratory testing discussed.    Assessment/Plan   Problem List Items Addressed This Visit           ICD-10-CM    Pain in the abdomen - Primary R10.9    Relevant Medications    " omeprazole (PriLOSEC) 40 mg DR capsule    Colitis K52.9    Relevant Medications    dicyclomine (Bentyl) 10 mg capsule     Other Visit Diagnoses         Codes      Dehydration     E86.0      Gastroesophageal reflux disease without esophagitis     K21.9        1. Abdominal pain, possible Crohn’s disease flareup.  He is going to see GI.  For pain, gave him some Bentyl.  2. Dehydration.  Oral hydration.  Monitor.  3. GERD, on PPI.  4. I will refer him to GI for priority appointment and get treatment started and eventually he will need colonoscopy once things are stable.    Scribe Attestation  By signing my name below, I, Shiraz Conley attest that this documentation has been prepared under the direction and in the presence of Wilver Kapoor MD.

## 2025-06-17 ENCOUNTER — APPOINTMENT (OUTPATIENT)
Dept: GASTROENTEROLOGY | Facility: CLINIC | Age: 35
End: 2025-06-17
Payer: COMMERCIAL

## 2025-06-17 VITALS — BODY MASS INDEX: 25.01 KG/M2 | HEART RATE: 55 BPM | WEIGHT: 165 LBS | HEIGHT: 68 IN

## 2025-06-17 DIAGNOSIS — R10.84 GENERALIZED ABDOMINAL PAIN: ICD-10-CM

## 2025-06-17 DIAGNOSIS — K50.10 CROHN'S DISEASE OF LARGE INTESTINE WITHOUT COMPLICATION (MULTI): Primary | ICD-10-CM

## 2025-06-17 PROCEDURE — 4004F PT TOBACCO SCREEN RCVD TLK: CPT | Performed by: INTERNAL MEDICINE

## 2025-06-17 PROCEDURE — 3008F BODY MASS INDEX DOCD: CPT | Performed by: INTERNAL MEDICINE

## 2025-06-17 PROCEDURE — 99214 OFFICE O/P EST MOD 30 MIN: CPT | Performed by: INTERNAL MEDICINE

## 2025-06-17 RX ORDER — POLYETHYLENE GLYCOL-3350 AND ELECTROLYTES WITH FLAVOR PACK 240; 5.84; 2.98; 6.72; 22.72 G/278.26G; G/278.26G; G/278.26G; G/278.26G; G/278.26G
4000 POWDER, FOR SOLUTION ORAL ONCE
Qty: 4000 ML | Refills: 0 | Status: SHIPPED | OUTPATIENT
Start: 2025-06-17 | End: 2025-06-17

## 2025-06-17 NOTE — PROGRESS NOTES
REASON FOR VISIT: Discuss abdominal pain, history of Crohn's disease    HPI:  Prem Mirza is a 34 y.o. male seen last more than 2 years ago.  At that time colonoscopy been performed and steroid some scattered aphthous erosions in the colon.  Terminal ileum was normal then.  States that he felt well after that for a while.  More recently over the past year has had some intermittent abdominal pain and diarrhea.  Recent CT scan without any bowel wall thickening noted.  Has intermittent diarrhea symptoms.  No rectal bleeding.  No family history of inflammatory bowel disease otherwise.  Has not been on any steroid therapy.  Intermittently will use NSAIDs for this abdominal pain.  No prior upper endoscopy.        PRIOR ENDOSCOPY    PAST MEDICAL HISTORY  Medical History[1]    PAST SURGICAL HISTORY  Surgical History[2]    FAMILY HISTORY  Family History[3]    SOCIAL HISTORY  Social History     Tobacco Use    Smoking status: Some Days     Current packs/day: 1.00     Average packs/day: 1 pack/day for 18.0 years (18.0 ttl pk-yrs)     Types: Cigarettes    Smokeless tobacco: Former     Types: Chew   Substance Use Topics    Alcohol use: Never     Alcohol/week: 4.0 standard drinks of alcohol     Types: 4 Shots of liquor per week       REVIEW OF SYSTEMS  CONSTITUTIONAL: negative for fever, chills, fatigue, or unintentional weight loss,   HEENT: negative for icteric sclera, eye pain/redness, or changes in vision/hearing  RESPIRATORY: negative for cough, hemoptysis, wheezing, orthopnea, or dyspnea on exertion  CARDIOVASCULAR: negative for chest pain, palpitations, or syncope   GASTROINTESTINAL: as noted per HPI  GENITOURINARY: negative for dysuria, polyuria, incontinence, or hematuria  MUSCULOSKELETAL: negative for arthralgia, myalgia, or joint swelling/stiffness   INTEGUMENTARY/SKIN: negative jaundice, rash, or skin lesion  HEMATOLOGIC/LYMPHATIC: negative for prolonged bleeding, easy bruising, or swollen lymph nodes  ENDOCRINE:  "negative for cold/heat intolerance, polydipsia, polyuria, or goiter  NEUROLOGIC: negative for headaches, dizziness, tremor, or gait abnormality  PSYCHIATRIC: negative for anxiety, depression, personality changes, or sleep disturbances      A 10 point review of systems was completed and was otherwise negative.    ALLERGIES  Allergies[4]    MEDICATIONS  Current Medications[5]    VITALS  Pulse 55   Ht 1.715 m (5' 7.5\")   Wt 74.8 kg (165 lb)   BMI 25.46 kg/m²      PHYSICAL EXAM  Alert oriented in no acute distress    ASSESSMENT/ PLAN  Patient with intermittent abdominal pain symptoms.  More than 2 years ago had colonoscopy with scattered aphthous erosions mild in nature.  Had discussed mild Crohn's disease then not on therapy.  Recent CT scan normal without bowel wall thickening noted.  I recommended upper endoscopy and colonoscopy to assess for active inflammatory bowel disease.  If evidence of active Crohn's disease we will proceed with therapy possibly biologic therapy given his symptoms.  He is in agreement with the plan will follow-up with me at endoscopy.        Signature: Gibson Huizar MD    Date: 6/17/2025  Time: 3:53 PM         [1] No past medical history on file.  [2] No past surgical history on file.  [3] No family history on file.  [4]   Allergies  Allergen Reactions    Amoxicillin-Pot Clavulanate Unknown    Cephalexin Unknown    Azithromycin Other   [5]   Current Outpatient Medications   Medication Sig Dispense Refill    acetaminophen (Tylenol) 325 mg tablet Take 2 tablets (650 mg) by mouth every 4 hours if needed for mild pain (1 - 3). 30 tablet 0    omeprazole (PriLOSEC) 40 mg DR capsule Take 1 capsule (40 mg) by mouth once daily in the morning. Take before meals. Do not crush or chew. 30 capsule 0    dicyclomine (Bentyl) 10 mg capsule Take 1 capsule (10 mg) by mouth 3 times a day. (Patient not taking: Reported on 6/17/2025) 270 capsule 0    polyethylene glycol (GaviLyte-C) 240-22.72-6.72 -5.84 gram " solution Take 4,000 mL by mouth 1 time for 1 dose. Drink 8 oz every 10-15 minutes until solution is gone 4000 mL 0     No current facility-administered medications for this visit.

## 2025-06-24 DIAGNOSIS — K52.9 COLITIS: ICD-10-CM

## 2025-06-24 DIAGNOSIS — K50.10 CROHN'S DISEASE OF LARGE INTESTINE WITHOUT COMPLICATION (MULTI): ICD-10-CM

## 2025-06-24 DIAGNOSIS — R10.9 ABDOMINAL PAIN, UNSPECIFIED ABDOMINAL LOCATION: ICD-10-CM

## 2025-06-24 RX ORDER — OMEPRAZOLE 40 MG/1
40 CAPSULE, DELAYED RELEASE ORAL
Qty: 30 CAPSULE | Refills: 0 | Status: SHIPPED | OUTPATIENT
Start: 2025-06-24

## 2025-06-24 RX ORDER — DICYCLOMINE HYDROCHLORIDE 10 MG/1
10 CAPSULE ORAL 3 TIMES DAILY
Qty: 270 CAPSULE | Refills: 0 | Status: SHIPPED | OUTPATIENT
Start: 2025-06-24

## 2025-06-24 RX ORDER — POLYETHYLENE GLYCOL-3350 AND ELECTROLYTES WITH FLAVOR PACK 240; 5.84; 2.98; 6.72; 22.72 G/278.26G; G/278.26G; G/278.26G; G/278.26G; G/278.26G
4000 POWDER, FOR SOLUTION ORAL ONCE
Qty: 4000 ML | Refills: 0 | Status: SHIPPED | OUTPATIENT
Start: 2025-06-24 | End: 2025-06-24

## 2025-06-27 ENCOUNTER — APPOINTMENT (OUTPATIENT)
Dept: PRIMARY CARE | Facility: CLINIC | Age: 35
End: 2025-06-27
Payer: COMMERCIAL

## 2025-07-03 ENCOUNTER — APPOINTMENT (OUTPATIENT)
Dept: GASTROENTEROLOGY | Facility: EXTERNAL LOCATION | Age: 35
End: 2025-07-03
Payer: COMMERCIAL

## 2025-07-03 DIAGNOSIS — K20.90 ESOPHAGITIS: ICD-10-CM

## 2025-07-03 DIAGNOSIS — K31.89 OTHER DISEASES OF STOMACH AND DUODENUM: ICD-10-CM

## 2025-07-03 DIAGNOSIS — K50.10 CROHN'S DISEASE OF LARGE INTESTINE WITHOUT COMPLICATION (MULTI): ICD-10-CM

## 2025-07-03 DIAGNOSIS — K50.90 CROHN'S DISEASE WITHOUT COMPLICATION, UNSPECIFIED GASTROINTESTINAL TRACT LOCATION: Primary | ICD-10-CM

## 2025-07-03 DIAGNOSIS — K63.89 OTHER SPECIFIED DISEASES OF INTESTINE: ICD-10-CM

## 2025-07-03 PROCEDURE — 45380 COLONOSCOPY AND BIOPSY: CPT | Performed by: INTERNAL MEDICINE

## 2025-07-03 PROCEDURE — 88305 TISSUE EXAM BY PATHOLOGIST: CPT | Performed by: PATHOLOGY

## 2025-07-03 PROCEDURE — 43239 EGD BIOPSY SINGLE/MULTIPLE: CPT | Performed by: INTERNAL MEDICINE

## 2025-07-03 PROCEDURE — 0753T DGTZ GLS MCRSCP SLD LEVEL IV: CPT

## 2025-07-03 PROCEDURE — 88305 TISSUE EXAM BY PATHOLOGIST: CPT

## 2025-07-09 ENCOUNTER — LAB REQUISITION (OUTPATIENT)
Dept: LAB | Facility: HOSPITAL | Age: 35
End: 2025-07-09
Payer: COMMERCIAL

## 2025-07-09 DIAGNOSIS — K50.10 CROHN'S DISEASE OF LARGE INTESTINE WITHOUT COMPLICATIONS (MULTI): ICD-10-CM

## 2025-07-15 LAB
LABORATORY COMMENT REPORT: NORMAL
PATH REPORT.FINAL DX SPEC: NORMAL
PATH REPORT.GROSS SPEC: NORMAL
PATH REPORT.RELEVANT HX SPEC: NORMAL
PATH REPORT.TOTAL CANCER: NORMAL